# Patient Record
Sex: MALE | Race: WHITE | Employment: OTHER | URBAN - METROPOLITAN AREA
[De-identification: names, ages, dates, MRNs, and addresses within clinical notes are randomized per-mention and may not be internally consistent; named-entity substitution may affect disease eponyms.]

---

## 2017-12-12 ENCOUNTER — OFFICE VISIT (OUTPATIENT)
Dept: FAMILY MEDICINE CLINIC | Age: 57
End: 2017-12-12

## 2017-12-12 VITALS
WEIGHT: 176 LBS | RESPIRATION RATE: 16 BRPM | HEART RATE: 89 BPM | TEMPERATURE: 98.2 F | DIASTOLIC BLOOD PRESSURE: 87 MMHG | SYSTOLIC BLOOD PRESSURE: 122 MMHG | HEIGHT: 70 IN | BODY MASS INDEX: 25.2 KG/M2

## 2017-12-12 DIAGNOSIS — L25.5 CONTACT DERMATITIS DUE TO PLANTS, EXCEPT FOOD, UNSPECIFIED CONTACT DERMATITIS TYPE: Primary | ICD-10-CM

## 2017-12-12 PROCEDURE — 99213 OFFICE O/P EST LOW 20 MIN: CPT | Performed by: FAMILY MEDICINE

## 2017-12-12 RX ORDER — TRIAMCINOLONE ACETONIDE 1 MG/G
CREAM TOPICAL
Qty: 30 G | Refills: 1 | Status: SHIPPED | OUTPATIENT
Start: 2017-12-12 | End: 2022-05-23

## 2017-12-12 ASSESSMENT — PATIENT HEALTH QUESTIONNAIRE - PHQ9
SUM OF ALL RESPONSES TO PHQ QUESTIONS 1-9: 1
1. LITTLE INTEREST OR PLEASURE IN DOING THINGS: 0
SUM OF ALL RESPONSES TO PHQ9 QUESTIONS 1 & 2: 1
2. FEELING DOWN, DEPRESSED OR HOPELESS: 1

## 2017-12-12 NOTE — PATIENT INSTRUCTIONS
You should quit smoking for the benefit of your health and those who love you. Your goal is to quit smoking. The first step is to cut down your cigarettes per week by half. This is your immediate goal.   Cut down your number of cigarettes per week in half before your next visit. Go to  [employer] on the internet for more assistance in your effort to quit smoking. Or call 5-762- Pratibha Small for more assistance. Good luck and keep up the effort! Many patients quit after several times of trying to quit and failing, so do not get discouraged if you don't quit on your first try. Use the topical cream for the rash. Set up a physical after the first of the year.

## 2017-12-12 NOTE — PROGRESS NOTES
Subjective:      Patient ID: Margot Mcgee is a 65 y.o. male. HPI  Issues discussed some. Dr Sveta Avila, [facility], prescribes pain and ADD meds. 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 Has Dx PMR and has multiple joints that hurt and have been damaged. Retired on [address] . Was a  and could not do the work any longer. Has rash on his hands and lower arms. He thinks poison sumac. Improving. Most of issues relate to nerve and joint sx. Been grinding his teeth. Review of Systems  Pretty ok except the joints and some stress sx / feeling    Objective:   Physical Exam   Constitutional: He is oriented to person, place, and time. He appears well-developed and well-nourished. No distress. Pulmonary/Chest: Effort normal.   Neurological: He is alert and oriented to person, place, and time. Skin:   Minor dermatitis hands. Contact look. Psychiatric: He has a normal mood and affect. His behavior is normal. Thought content normal.       Assessment:        Contact dermatitis        Plan:         Discussed use of antibiotics. Advised when takes them he needs to take the whole course of medication or risk developing resistant bacteria. Has liked to keep antibiotics on hand if he gets infections. Topical steroids,  If not effective can call for Dosepak     Declines flu shot. Recommend setting up a physical    Prior to Visit Medications    Medication Sig Taking? Authorizing Provider   triamcinolone (KENALOG) 0.1 % cream Apply topically 2 times daily.  Yes Dama Sicard, DO   methylphenidate (RITALIN) 10 MG tablet Take 10 mg by mouth Yes Historical Provider, MD   ibuprofen (ADVIL;MOTRIN) 800 MG tablet Take 800 mg by mouth 2 times daily Yes Historical Provider, MD   methadone (DOLOPHINE) 10 MG tablet Take 10 mg by mouth 2 times daily Yes Historical Provider, MD   oxyCODONE-acetaminophen (PERCOCET)  MG per tablet Take 1 tablet by mouth 2 times daily Yes Historical Provider, MD           Will address preventive care issues at his physical.

## 2019-07-19 ENCOUNTER — TELEPHONE (OUTPATIENT)
Dept: FAMILY MEDICINE CLINIC | Age: 59
End: 2019-07-19

## 2021-12-04 ENCOUNTER — APPOINTMENT (OUTPATIENT)
Dept: GENERAL RADIOLOGY | Age: 61
End: 2021-12-04
Payer: COMMERCIAL

## 2021-12-04 ENCOUNTER — HOSPITAL ENCOUNTER (EMERGENCY)
Age: 61
Discharge: HOME | End: 2021-12-04
Payer: COMMERCIAL

## 2021-12-04 VITALS
OXYGEN SATURATION: 99 % | WEIGHT: 190 LBS | HEART RATE: 80 BPM | HEIGHT: 73 IN | TEMPERATURE: 98.4 F | SYSTOLIC BLOOD PRESSURE: 134 MMHG | RESPIRATION RATE: 16 BRPM | DIASTOLIC BLOOD PRESSURE: 84 MMHG | BODY MASS INDEX: 25.18 KG/M2

## 2021-12-04 DIAGNOSIS — S72.114A NONDISPLACED FRACTURE OF GREATER TROCHANTER OF RIGHT FEMUR, INITIAL ENCOUNTER FOR CLOSED FRACTURE (HCC): Primary | ICD-10-CM

## 2021-12-04 PROCEDURE — 6370000000 HC RX 637 (ALT 250 FOR IP): Performed by: PHYSICIAN ASSISTANT

## 2021-12-04 PROCEDURE — 99283 EMERGENCY DEPT VISIT LOW MDM: CPT

## 2021-12-04 PROCEDURE — 73502 X-RAY EXAM HIP UNI 2-3 VIEWS: CPT

## 2021-12-04 RX ORDER — OXYCODONE AND ACETAMINOPHEN 7.5; 325 MG/1; MG/1
1 TABLET ORAL ONCE
Status: COMPLETED | OUTPATIENT
Start: 2021-12-04 | End: 2021-12-04

## 2021-12-04 RX ORDER — OXYCODONE AND ACETAMINOPHEN 10; 325 MG/1; MG/1
1 TABLET ORAL EVERY 8 HOURS PRN
Qty: 12 TABLET | Refills: 0 | Status: SHIPPED | OUTPATIENT
Start: 2021-12-04 | End: 2021-12-08

## 2021-12-04 RX ADMIN — OXYCODONE HYDROCHLORIDE AND ACETAMINOPHEN 1 TABLET: 7.5; 325 TABLET ORAL at 18:34

## 2021-12-04 ASSESSMENT — PAIN SCALES - GENERAL: PAINLEVEL_OUTOF10: 8

## 2021-12-04 NOTE — ED TRIAGE NOTES
Pt reports falling onto his R hip last night. Has history of bilateral hip replacements. Uses crutches to get around.

## 2021-12-04 NOTE — ED PROVIDER NOTES
[facility] Emergency Department    CHIEF COMPLAINT  Fall (fell onto R hip last night) and Hip Pain (RIGHT)      SHARED SERVICE VISIT  Evaluated by ROBYN. My supervising physician was available for consultation. HISTORY OF PRESENT ILLNESS  Carrington Rollins is a 62 y.o. male who presents to the ED complaining of right hip pain. Patient has history of bilateral hip replacement and reports history of chronic pain as well. Patient states that he does fall frequently given the difficulty with stability of his hips. Patient states he was turning yesterday when he fell from a stool landing on his right hip. Patient states that he is now noting pain over his right hip that travels down the outside of his right leg. Patient reports pain with ambulation as well as pain with sitting. Patient states that he is concerned that the prosthesis might a fracture of the acetabulum. Injury to the head or neck. Patient states that he has not taken any medication for pain. No other complaints, modifying factors or associated symptoms. Nursing notes reviewed. Past Medical History:   Diagnosis Date    Foot pain     bilat - gotten worse    Osteoarthritis      Past Surgical History:   Procedure Laterality Date    HIP SURGERY Bilateral 2002    JOINT REPLACEMENT Bilateral     bilat hip replacement    JOINT REPLACEMENT Bilateral     shoulder replacement    SHOULDER SURGERY Bilateral 2002     History reviewed. No pertinent family history.   Social History     Socioeconomic History    Marital status: Single     Spouse name: Not on file    Number of children: Not on file    Years of education: Not on file    Highest education level: Not on file   Occupational History    Not on file   Tobacco Use    Smoking status: Current Some Day Smoker    Smokeless tobacco: Never Used    Tobacco comment: infrequently smokes a pipe or cigarette - hasn't had any since September 17   Substance and Sexual Activity    Alcohol use: Yes     Alcohol/week: 0.0 standard drinks     Comment: occassional    Drug use: Not on file    Sexual activity: Yes     Partners: Female   Other Topics Concern    Not on file   Social History Narrative    Not on file     Social Determinants of Health     Financial Resource Strain:     Difficulty of Paying Living Expenses: Not on file   Food Insecurity:     Worried About Running Out of Food in the Last Year: Not on file    Denae of Food in the Last Year: Not on file   Transportation Needs:     Lack of Transportation (Medical): Not on file    Lack of Transportation (Non-Medical): Not on file   Physical Activity:     Days of Exercise per Week: Not on file    Minutes of Exercise per Session: Not on file   Stress:     Feeling of Stress : Not on file   Social Connections:     Frequency of Communication with Friends and Family: Not on file    Frequency of Social Gatherings with Friends and Family: Not on file    Attends Rastafarian Services: Not on file    Active Member of [address] [place] [address] or Organizations: Not on file    Attends Club or Organization Meetings: Not on file    Marital Status: Not on file   Intimate Partner Violence:     Fear of Current or Ex-Partner: Not on file    Emotionally Abused: Not on file    Physically Abused: Not on file    Sexually Abused: Not on file   Housing Stability:     Unable to Pay for Housing in the Last Year: Not on file    Number of [place] in the Last Year: Not on file    Unstable Housing in the Last Year: Not on file     No current facility-administered medications for this encounter. Current Outpatient Medications   Medication Sig Dispense Refill    oxyCODONE-acetaminophen (PERCOCET)  MG per tablet Take 1 tablet by mouth every 8 hours as needed for Pain for up to 4 days. Intended supply: 30 days 12 tablet 0    triamcinolone (KENALOG) 0.1 % cream Apply topically 2 times daily.  30 g 1    methylphenidate (RITALIN) 10 MG tablet Take 10 mg by mouth      ibuprofen (ADVIL;MOTRIN) 800 MG tablet Take 800 mg by mouth 2 times daily      methadone (DOLOPHINE) 10 MG tablet Take 10 mg by mouth 2 times daily       No Known Allergies    REVIEW OF SYSTEMS  7 systems reviewed, pertinent positives per HPI otherwise noted to be negative    PHYSICAL EXAM  /84   Pulse 80   Temp 98.4 °F (36.9 °C) (Oral)   Resp 16   Ht 6' 1\" (1.854 m)   Wt 190 lb (86.2 kg)   SpO2 99%   BMI 25.07 kg/m²   GENERAL APPEARANCE: Awake and alert. Cooperative. HEAD: Normocephalic. Atraumatic. EYES: EOM grossly intact. ENT: Mucous membranes are moist.   NECK: Supple. HEART: RRR. No murmurs. LUNGS: Respirations unlabored. CTAB. Good air exchange. Speaking comfortably in full sentences. EXTREMITIES: Pain with palpation to the right greater trochanter. Pain with range of motion of the right hip. Pain with internal or external rotation. SKIN: Warm and dry. No acute rashes. NEUROLOGICAL: Alert and oriented. No gross facial drooping. PSYCHIATRIC: Normal mood and affect. RADIOLOGY  XR HIP 2-3 VW W PELVIS RIGHT   Final Result   Periprosthetic fracture in the region the right greater trochanter               LABS  Labs Reviewed - No data to display    PROCEDURES  Unless otherwise noted below, none  Procedures    MDM  70-year-old male history of chronic pain s/p bilateral hip replacements and prior pelvic fractures presents to ED for evaluation of right hip pain after fall that occurred yesterday. The fall was from roughly 2 to 3 feet landing on his right hip. On exam patient reports pain with any movement or palpation to the right hip. We will plan to obtain x-ray imaging to rule out acute fracture. Discussed with the patient plan with orthopedics as well as pain management. Patient states that he has seen pain management in the past but did not have much success. No injury to the head neck or loss consciousness reported.     X-ray imaging did demonstrate

## 2021-12-05 NOTE — CONSULTS
Placed call to Orthopedic Surgery @ 0026  RE: Arely Valle MD; Farideh George; greater trochanter fracture per Wilman Brothers called back @ [phone]

## 2021-12-07 DIAGNOSIS — S72.114A NONDISPLACED FRACTURE OF GREATER TROCHANTER OF RIGHT FEMUR, INITIAL ENCOUNTER FOR CLOSED FRACTURE (HCC): ICD-10-CM

## 2021-12-07 RX ORDER — OXYCODONE HYDROCHLORIDE AND ACETAMINOPHEN 5; 325 MG/1; MG/1
1 TABLET ORAL EVERY 6 HOURS PRN
Qty: 42 TABLET | Refills: 0 | Status: SHIPPED | OUTPATIENT
Start: 2021-12-07 | End: 2021-12-14

## 2021-12-07 RX ORDER — TIZANIDINE 4 MG/1
4 TABLET ORAL 4 TIMES DAILY PRN
Qty: 120 TABLET | Refills: 0 | Status: SHIPPED | OUTPATIENT
Start: 2021-12-07 | End: 2022-01-12 | Stop reason: SDUPTHER

## 2021-12-08 NOTE — PROGRESS NOTES
pateint with recent fall . Chronic history of PMR with bilateral hips metal on metal bearings and bilateral shoulder resurfacing. Phone evalution after Gayla jones noted fracture of the greater trochanter. Plan for pain medications and recheck in anohter 4-5 days for displacement.

## 2021-12-16 ENCOUNTER — OFFICE VISIT (OUTPATIENT)
Dept: ORTHOPEDIC SURGERY | Age: 61
End: 2021-12-16
Payer: COMMERCIAL

## 2021-12-16 DIAGNOSIS — M16.0 PRIMARY OSTEOARTHRITIS OF BOTH HIPS: Primary | ICD-10-CM

## 2021-12-16 PROCEDURE — 99203 OFFICE O/P NEW LOW 30 MIN: CPT | Performed by: ORTHOPAEDIC SURGERY

## 2021-12-16 PROCEDURE — 27246 TREAT THIGH FRACTURE: CPT | Performed by: ORTHOPAEDIC SURGERY

## 2021-12-16 RX ORDER — OXYCODONE AND ACETAMINOPHEN 10; 325 MG/1; MG/1
1 TABLET ORAL EVERY 8 HOURS PRN
Qty: 45 TABLET | Refills: 0 | Status: SHIPPED | OUTPATIENT
Start: 2021-12-16 | End: 2022-01-12 | Stop reason: SDUPTHER

## 2021-12-20 NOTE — PROGRESS NOTES
Patient Name: Geraldo Garcia  : 1960  DOS: 2021        Chief Complaint:   Chief Complaint   Patient presents with    Hip Pain     RIGHT HIP-[facility] ED 21       History of Present Illness:  Geraldo Garcia is a 62 y.o. male who presents with a chief complaint of that of right hip pain. He said a history of polymyalgia rheumatica with AVN of both shoulders and both hips. He has been managing work details until just recently when he underwent evaluations and resulted in permanent disability. He is felt that he has had intermittent subluxations of both his hips and 1 week plus ago he fell while in his garage directly onto his right hip was seen at [facility] ED on 2021 and noted to have a greater trochanter fracture. He has been limited to wheelchair crutch and walker ambulation which is very unusual for him. His pain levels are so significant that they to him resemble what he had prior to his surgical interventions originally. He has not had blood test since his original intervention more because of apprehensions regarding potential problems and also insurance related issues. Medical History  Current Medications:   Prior to Admission medications    Medication Sig Start Date End Date Taking? Authorizing Provider   oxyCODONE-acetaminophen (PERCOCET)  MG per tablet Take 1 tablet by mouth every 8 hours as needed for Pain for up to 30 days. Intended supply: 30 days 12/16/21 1/15/22 Yes Jessee Ring MD   tiZANidine (ZANAFLEX) 4 MG tablet Take 1 tablet by mouth 4 times daily as needed (muscle spasm) 21   Yenny Landeros MD   triamcinolone (KENALOG) 0.1 % cream Apply topically 2 times daily.  17   Ruchi Magana DO   methylphenidate (RITALIN) 10 MG tablet Take 10 mg by mouth    Historical Provider, MD   ibuprofen (ADVIL;MOTRIN) 800 MG tablet Take 800 mg by mouth 2 times daily    Historical Provider, MD   methadone (DOLOPHINE) 10 MG tablet Take 10 mg by mouth 2 times daily    Historical Provider, MD     Allergies: No Known Allergies  Past Medical History:   Diagnosis Date    Foot pain     bilat - gotten worse    Osteoarthritis      Social History     Socioeconomic History    Marital status: Single     Spouse name: Not on file    Number of children: Not on file    Years of education: Not on file    Highest education level: Not on file   Occupational History    Not on file   Tobacco Use    Smoking status: Current Some Day Smoker    Smokeless tobacco: Never Used    Tobacco comment: infrequently smokes a pipe or cigarette - hasn't had any since September 17   Substance and Sexual Activity    Alcohol use: Yes     Alcohol/week: 0.0 standard drinks     Comment: occassional    Drug use: Not on file    Sexual activity: Yes     Partners: Female   Other Topics Concern    Not on file   Social History Narrative    Not on file     Social Determinants of Health     Financial Resource Strain:     Difficulty of Paying Living Expenses: Not on file   Food Insecurity:     Worried About Running Out of Food in the Last Year: Not on file    Denae of Food in the Last Year: Not on file   Transportation Needs:     Lack of Transportation (Medical): Not on file    Lack of Transportation (Non-Medical):  Not on file   Physical Activity:     Days of Exercise per Week: Not on file    Minutes of Exercise per Session: Not on file   Stress:     Feeling of Stress : Not on file   Social Connections:     Frequency of Communication with Friends and Family: Not on file    Frequency of Social Gatherings with Friends and Family: Not on file    Attends [employer] Services: Not on file    Active Member of Clubs or Organizations: Not on file    Attends Club or Organization Meetings: Not on file    Marital Status: Not on file   Intimate Partner Violence:     Fear of Current or Ex-Partner: Not on file    Emotionally Abused: Not on file    Physically Abused: Not on file   Kofis Roys Sexually temperature or color discrepancies. NEUROLOGICALLY: There is no evidence for sensory or motor deficits in the extremity. Coordination appears full with no spacticity or rigidity. Reflexes appear to be symmetric. Distal circulation intact. No signs of RSD. Additional Comments:     Diagnostic Test Findings: New x-rays were obtained of the right hip with some evaluation of the left hip noting metal-on-metal implants and fracture of the greater trochanter with some displacement although no proximal migration    Procedure(s): None    Assessment and Plan:  1. Primary osteoarthritis of both hips        No follow-ups on file. The orders below, if any, were placed during this visit:   Orders Placed This Encounter   Procedures    XR HIP RIGHT (2-3 VIEWS)       Impression:   [unfilled]    Treatment Plan: Fracture noted of the greater trochanter right total hip arthroplasty with presumed osteolysis from metal-on-metal debris bilaterally    No signs of any vincent fracture related to subsidence of the prosthesis or loss of fixation of the acetabulum  Lab tests are unavailable for sedimentation rate or C-reactive protein but these were ordered. We have recommended recheck in another 2 weeks to see that the fracture has not migrated.     Ultimately once the fracture heals revision hip arthroplasty may be necessary to finalize the metal-on-metal debris and limited its extension into the acetabulum and femoral bone         Elizabeth Rodriguez MD

## 2022-01-12 ENCOUNTER — OFFICE VISIT (OUTPATIENT)
Dept: ORTHOPEDIC SURGERY | Age: 62
End: 2022-01-12

## 2022-01-12 VITALS — BODY MASS INDEX: 25.18 KG/M2 | HEIGHT: 73 IN | WEIGHT: 190 LBS

## 2022-01-12 DIAGNOSIS — M16.0 PRIMARY OSTEOARTHRITIS OF BOTH HIPS: ICD-10-CM

## 2022-01-12 DIAGNOSIS — S72.351D CLOSED DISPLACED COMMINUTED FRACTURE OF SHAFT OF RIGHT FEMUR WITH ROUTINE HEALING, SUBSEQUENT ENCOUNTER: Primary | ICD-10-CM

## 2022-01-12 PROCEDURE — 99024 POSTOP FOLLOW-UP VISIT: CPT | Performed by: ORTHOPAEDIC SURGERY

## 2022-01-12 RX ORDER — TIZANIDINE 4 MG/1
4 TABLET ORAL 4 TIMES DAILY PRN
Qty: 120 TABLET | Refills: 0 | Status: ON HOLD | OUTPATIENT
Start: 2022-01-12 | End: 2022-05-27 | Stop reason: SDUPTHER

## 2022-01-12 RX ORDER — OXYCODONE AND ACETAMINOPHEN 10; 325 MG/1; MG/1
1 TABLET ORAL EVERY 8 HOURS PRN
Qty: 45 TABLET | Refills: 0 | Status: SHIPPED | OUTPATIENT
Start: 2022-01-12 | End: 2022-02-09 | Stop reason: SDUPTHER

## 2022-01-12 NOTE — PROGRESS NOTES
Patient Name: Stefano Alcala  : 1960  DOS: 2022        Chief Complaint:   Chief Complaint   Patient presents with    Hip Pain     Bilateral hip pain, 9/10     Pain has been changing but he is feeling less pain overall with improved walking. History of Present Illness:  Stefano Alcala is a 65 y.o. male who presents with a chief complaint of that of right hip pain. He said a history of polymyalgia rheumatica with AVN of both shoulders and both hips. He has been managing work details until just recently when he underwent evaluations and resulted in permanent disability. He is felt that he has had intermittent subluxations of both his hips and 1 week plus ago he fell while in his garage directly onto his right hip was seen at [facility] ED on 2021 and noted to have a greater trochanter fracture. He has been limited to wheelchair crutch and walker ambulation which is very unusual for him. His pain levels are so significant that they to him resemble what he had prior to his surgical interventions originally. He has not had blood test since his original intervention more because of apprehensions regarding potential problems and also insurance related issues. Medical History  Current Medications:   Prior to Admission medications    Medication Sig Start Date End Date Taking? Authorizing Provider   tiZANidine (ZANAFLEX) 4 MG tablet Take 1 tablet by mouth 4 times daily as needed (muscle spasm) 22  Yes Ivan Brady MD   oxyCODONE-acetaminophen (PERCOCET)  MG per tablet Take 1 tablet by mouth every 8 hours as needed for Pain for up to 30 days. Intended supply: 30 days 22 Yes Sambhu Maxene Goldberg, MD   triamcinolone (KENALOG) 0.1 % cream Apply topically 2 times daily.  17   Kayla Means, DO   methylphenidate (RITALIN) 10 MG tablet Take 10 mg by mouth    Historical Provider, MD   ibuprofen (ADVIL;MOTRIN) 800 MG tablet Take 800 mg by mouth 2 times daily Historical Provider, MD   methadone (DOLOPHINE) 10 MG tablet Take 10 mg by mouth 2 times daily    Historical Provider, MD     Allergies: No Known Allergies  Past Medical History:   Diagnosis Date    Foot pain     bilat - gotten worse    Osteoarthritis      Social History     Socioeconomic History    Marital status: Single     Spouse name: Not on file    Number of children: Not on file    Years of education: Not on file    Highest education level: Not on file   Occupational History    Not on file   Tobacco Use    Smoking status: Current Some Day Smoker    Smokeless tobacco: Never Used    Tobacco comment: infrequently smokes a pipe or cigarette - hasn't had any since September 17   Substance and Sexual Activity    Alcohol use: Yes     Alcohol/week: 0.0 standard drinks     Comment: occassional    Drug use: Not on file    Sexual activity: Yes     Partners: Female   Other Topics Concern    Not on file   Social History Narrative    Not on file     Social Determinants of Health     Financial Resource Strain:     Difficulty of Paying Living Expenses: Not on file   Food Insecurity:     Worried About Running Out of Food in the Last Year: Not on file    Denae of Food in the Last Year: Not on file   Transportation Needs:     Lack of Transportation (Medical): Not on file    Lack of Transportation (Non-Medical):  Not on file   Physical Activity:     Days of Exercise per Week: Not on file    Minutes of Exercise per Session: Not on file   Stress:     Feeling of Stress : Not on file   Social Connections:     Frequency of Communication with Friends and Family: Not on file    Frequency of Social Gatherings with Friends and Family: Not on file    Attends Oriental orthodox Services: Not on file    Active Member of Clubs or Organizations: Not on file    Attends Club or Organization Meetings: Not on file    Marital Status: Not on file   Intimate Partner Violence:     Fear of Current or Ex-Partner: Not on file   [place] Emotionally Abused: Not on file    Physically Abused: Not on file    Sexually Abused: Not on file   Housing Stability:     Unable to Pay for Housing in the Last Year: Not on file    Number of Places Lived in the Last Year: Not on file    Unstable Housing in the Last Year: Not on file     No family history on file. Review of Systems:   Review of Systems   Constitutional: Negative for chills and fever. HENT: Negative for nosebleeds. Eyes: Negative for double vision. Cardiovascular: Negative for chest pain. Gastrointestinal: Negative for abdominal pain. Musculoskeletal: Positive for joint pain and myalgias. Skin: Negative for rash. Neurological: Negative for seizures. Psychiatric/Behavioral: Negative for hallucinations. All other systems reviewed and negative     Assessment   Vital Signs:   Ht 6' 1\" (1.854 m)   Wt 190 lb (86.2 kg)   BMI 25.07 kg/m²     Physical Exam   Constitutional: Patient is oriented to person, place, and time and well-developed, well-nourished, and in no distress. HENT:   Head: Normocephalic and atraumatic. Eyes: Pupils are equal, round, and reactive to light. Neck: No tracheal deviation present. No thyromegaly present. Pulmonary/Chest: Effort normal.   Abdominal: Soft. There is no guarding. Musculoskeletal: Patient exhibits tenderness and pain. Neurological: Patient is alert and oriented to person, place, and time. Skin: Skin is warm. Psychiatric: Affect normal.       Right Hip Examination    Inspection: There is no swelling or ecchymosis. There is no obvious deformity. Palpation: There is tenderness over the greater trochanter. There is anterior groin tenderness. Range of Motion: ANIT567 ER10  IR10 limited secondary to pain. Bruising of the anterior and psoterior thigh    Strength: Hip flexors rated: 4/5. Special Tests: Trendelenberg sign: positive       Skin: There are no rashes, ulcerations or lesions.     Gait: Patient walks with a limp.     Skin shows no rashes/ecchymosis to the affected area, no hyperesthesias, no discoloration, no temperature or color discrepancies. NEUROLOGICALLY: There is no evidence for sensory or motor deficits in the extremity. Coordination appears full with no spacticity or rigidity. Reflexes appear to be symmetric. Distal circulation intact. No signs of RSD. Additional Comments:     Diagnostic Test Findings: New x-rays were obtained of the right hip with some evaluation of the left hip noting metal-on-metal implants and fracture of the greater trochanter with some displacement and now proximal migration  Subluxation of the head noted on ap supine film with reduction upon roation of the leg. Procedure(s): None    Assessment and Plan:  1. Closed displaced comminuted fracture of shaft of right femur with routine healing, subsequent encounter    2. Primary osteoarthritis of both hips        No follow-ups on file. The orders below, if any, were placed during this visit:   Orders Placed This Encounter   Procedures    XR HIP RIGHT (2-3 VIEWS)     Standing Status:   Future     Number of Occurrences:   1     Standing Expiration Date:   1/12/2023       Impression:   [unfilled]    Treatment Plan: Fracture noted of the greater trochanter right total hip arthroplasty with presumed osteolysis from metal-on-metal debris bilaterally    No signs of any vincent fracture related to subsidence of the prosthesis or loss of fixation of the acetabulum  Lab tests are unavailable for sedimentation rate or C-reactive protein but these were ordered. We have recommended recheck in another 2 weeks to see that the fracture has not migrated.     Ultimately once the fracture heals revision hip arthroplasty may be necessary to finalize the metal-on-metal debris and limited its extension into the acetabulum and femoral bone         Teri Griffin MD

## 2022-02-09 ENCOUNTER — OFFICE VISIT (OUTPATIENT)
Dept: ORTHOPEDIC SURGERY | Age: 62
End: 2022-02-09
Payer: MEDICARE

## 2022-02-09 VITALS — WEIGHT: 190 LBS | BODY MASS INDEX: 25.18 KG/M2 | HEIGHT: 73 IN

## 2022-02-09 DIAGNOSIS — S72.351D CLOSED DISPLACED COMMINUTED FRACTURE OF SHAFT OF RIGHT FEMUR WITH ROUTINE HEALING, SUBSEQUENT ENCOUNTER: Primary | ICD-10-CM

## 2022-02-09 DIAGNOSIS — M16.0 PRIMARY OSTEOARTHRITIS OF BOTH HIPS: ICD-10-CM

## 2022-02-09 DIAGNOSIS — M25.561 RIGHT KNEE PAIN, UNSPECIFIED CHRONICITY: ICD-10-CM

## 2022-02-09 PROBLEM — S72.351A CLOSED DISPLACED COMMINUTED FRACTURE OF SHAFT OF RIGHT FEMUR (HCC): Status: ACTIVE | Noted: 2022-02-09

## 2022-02-09 PROCEDURE — L1812 KO ELASTIC W/JOINTS PRE OTS: HCPCS | Performed by: ORTHOPAEDIC SURGERY

## 2022-02-09 PROCEDURE — 99024 POSTOP FOLLOW-UP VISIT: CPT | Performed by: ORTHOPAEDIC SURGERY

## 2022-02-09 RX ORDER — OXYCODONE AND ACETAMINOPHEN 10; 325 MG/1; MG/1
1 TABLET ORAL EVERY 8 HOURS PRN
Qty: 40 TABLET | Refills: 0 | Status: SHIPPED | OUTPATIENT
Start: 2022-02-09 | End: 2022-03-09 | Stop reason: SDUPTHER

## 2022-02-09 RX ORDER — AMOXICILLIN AND CLAVULANATE POTASSIUM 875; 125 MG/1; MG/1
1 TABLET, FILM COATED ORAL 2 TIMES DAILY
Qty: 20 TABLET | Refills: 2 | Status: SHIPPED | OUTPATIENT
Start: 2022-02-09 | End: 2022-03-11

## 2022-02-09 NOTE — PROGRESS NOTES
Patient Name: Radha Carter  : 1960  DOS: 2022        Chief Complaint:   Chief Complaint   Patient presents with    Hip Pain     FX RIGHT GREATER [address]        More pain in the right knee over the past several weeks. Pain has been changing but he is feeling less pain overall with improved walking with regards to the hip on the right. History of Present Illness:  Radha Carter is a 65 y.o. male who presents with a chief complaint of that of right hip pain. He said a history of polymyalgia rheumatica with AVN of both shoulders and both hips. He has been managing work details until just recently when he underwent evaluations and resulted in permanent disability. He is felt that he has had intermittent subluxations of both his hips and 1 week plus ago he fell while in his garage directly onto his right hip was seen at [facility] ED on 2021 and noted to have a greater trochanter fracture. He has been limited to wheelchair crutch and walker ambulation which is very unusual for him. His pain levels are so significant that they to him resemble what he had prior to his surgical interventions originally. He has not had blood test since his original intervention more because of apprehensions regarding potential problems and also insurance related issues. Medical History  Current Medications:   Prior to Admission medications    Medication Sig Start Date End Date Taking? Authorizing Provider   oxyCODONE-acetaminophen (PERCOCET)  MG per tablet Take 1 tablet by mouth every 8 hours as needed for Pain for up to 30 days.  Intended supply: 30 days 2/9/22 3/11/22 Yes Marcia Watson MD   amoxicillin-clavulanate (AUGMENTIN) 875-125 MG per tablet Take 1 tablet by mouth 2 times daily 2/9/22 3/11/22 Yes Jessee Mtz MD   tiZANidine (ZANAFLEX) 4 MG tablet Take 1 tablet by mouth 4 times daily as needed (muscle spasm) 22   Marcia Watson MD   triamcinolone (KENALOG) 0.1 % cream Apply topically 2 times daily. 12/12/17   Jaylin Longoria,    methylphenidate (RITALIN) 10 MG tablet Take 10 mg by mouth    Historical Provider, MD   ibuprofen (ADVIL;MOTRIN) 800 MG tablet Take 800 mg by mouth 2 times daily    Historical Provider, MD   methadone (DOLOPHINE) 10 MG tablet Take 10 mg by mouth 2 times daily    Historical Provider, MD     Allergies: No Known Allergies  Past Medical History:   Diagnosis Date    Foot pain     bilat - gotten worse    Osteoarthritis      Social History     Socioeconomic History    Marital status: Single     Spouse name: Not on file    Number of children: Not on file    Years of education: Not on file    Highest education level: Not on file   Occupational History    Not on file   Tobacco Use    Smoking status: Current Some Day Smoker    Smokeless tobacco: Never Used    Tobacco comment: infrequently smokes a pipe or cigarette - hasn't had any since September 17   Substance and Sexual Activity    Alcohol use: Yes     Alcohol/week: 0.0 standard drinks     Comment: occassional    Drug use: Not on file    Sexual activity: Yes     Partners: Female   Other Topics Concern    Not on file   Social History Narrative    Not on file     Social Determinants of Health     Financial Resource Strain:     Difficulty of Paying Living Expenses: Not on file   Food Insecurity:     Worried About Running Out of Food in the Last Year: Not on file    Denae of Food in the Last Year: Not on file   Transportation Needs:     Lack of Transportation (Medical): Not on file    Lack of Transportation (Non-Medical):  Not on file   Physical Activity:     Days of Exercise per Week: Not on file    Minutes of Exercise per Session: Not on file   Stress:     Feeling of Stress : Not on file   Social Connections:     Frequency of Communication with Friends and Family: Not on file    Frequency of Social Gatherings with Friends and Family: Not on file    Attends Rastafarian Services: Not on file   [address] [employer] or Organizations: Not on file    Attends Club or Organization Meetings: Not on file    Marital Status: Not on file   Intimate Partner Violence:     Fear of Current or Ex-Partner: Not on file    Emotionally Abused: Not on file    Physically Abused: Not on file    Sexually Abused: Not on file   Housing Stability:     Unable to Pay for Housing in the Last Year: Not on file    Number of [place] in the Last Year: Not on file    Unstable Housing in the Last Year: Not on file     No family history on file. Review of Systems:   Review of Systems   Constitutional: Negative for chills and fever. HENT: Negative for nosebleeds. Eyes: Negative for double vision. Cardiovascular: Negative for chest pain. Gastrointestinal: Negative for abdominal pain. Musculoskeletal: Positive for joint pain and myalgias. Skin: Negative for rash. Neurological: Negative for seizures. Psychiatric/Behavioral: Negative for hallucinations. All other systems reviewed and negative     Assessment   Vital Signs:   Ht 6' 1\" (1.854 m)   Wt 190 lb (86.2 kg)   BMI 25.07 kg/m²     Physical Exam   Constitutional: Patient is oriented to person, place, and time and well-developed, well-nourished, and in no distress. HENT:   Head: Normocephalic and atraumatic. Eyes: Pupils are equal, round, and reactive to light. Neck: No tracheal deviation present. No thyromegaly present. Pulmonary/Chest: Effort normal.   Abdominal: Soft. There is no guarding. Musculoskeletal: Patient exhibits tenderness and pain. Neurological: Patient is alert and oriented to person, place, and time. Skin: Skin is warm. Psychiatric: Affect normal.       Right Hip Examination    Inspection: There is no swelling or ecchymosis. There is no obvious deformity. Palpation: There is tenderness over the greater trochanter. There is anterior groin tenderness.      Range of Motion: [id number] ER10  IR10 limited secondary to pain. Bruising of the anterior and psoterior thigh    Strength: Hip flexors rated: 4/5. Special Tests: Trendelenberg sign: positive       Skin: There are no rashes, ulcerations or lesions. Gait: Patient walks with a limp. Flexion and abduction with some weakness but improved. Skin shows no rashes/ecchymosis to the affected area, no hyperesthesias, no discoloration, no temperature or color discrepancies. NEUROLOGICALLY: There is no evidence for sensory or motor deficits in the extremity. Coordination appears full with no spacticity or rigidity. Reflexes appear to be symmetric. Distal circulation intact. No signs of RSD. Knee on the same side with valgus angulation and pain lateral joint. pseudolaxity  but correction to neutral.       Additional Comments:     Diagnostic Test Findings: New x-rays were obtained of the right hip with some evaluation of the left hip noting metal-on-metal implants and fracture of the greater trochanter with some displacement and now proximal migration  Subluxation of the head noted on ap supine film with reduction upon roation of the leg. Procedure(s): None    Assessment and Plan:  1. Closed displaced comminuted fracture of shaft of right femur with routine healing, subsequent encounter    2. Right knee pain, unspecified chronicity    3. Primary osteoarthritis of both hips        No follow-ups on file.     The orders below, if any, were placed during this visit:   Orders Placed This Encounter   Procedures    XR HIP 2-3 VW W PELVIS RIGHT     Standing Status:   Future     Number of Occurrences:   1     Standing Expiration Date:   3/9/2022     Order Specific Question:   Reason for exam:     Answer:   Pain    XR KNEE RIGHT (MIN 4 VIEWS)     Standing Status:   Future     Number of Occurrences:   1     Standing Expiration Date:   3/9/2022     Order Specific Question:   Reason for exam:     Answer:   Pain       Impression:   [unfilled]    Treatment Plan: Fracture noted of the greater trochanter right total hip arthroplasty with presumed osteolysis from metal-on-metal debris bilaterally    No signs of any vincent fracture related to subsidence of the prosthesis or loss of fixation of the acetabulum  Lab tests are unavailable for sedimentation rate or C-reactive protein but these were ordered. We have recommended recheck in another 2 weeks to see that the fracture has not migrated. Ultimately once the fracture heals revision hip arthroplasty may be necessary to finalize the metal-on-metal debris and limited its extension into the acetabulum and femoral bone    Right knee unfortunately has major lateral joint line loss with bone on bone changes and valgus angulation that is apparent clinically as well. Potential for additional knee arthroplasty noted in the future. Follow now for continued bone healing across the migrated greater troch fracture. Plan for potential CT scan at some time in the future for additional planning once hip is more healed.           Fiona Griggs MD no

## 2022-03-09 ENCOUNTER — OFFICE VISIT (OUTPATIENT)
Dept: ORTHOPEDIC SURGERY | Age: 62
End: 2022-03-09

## 2022-03-09 VITALS — BODY MASS INDEX: 25.18 KG/M2 | WEIGHT: 190 LBS | HEIGHT: 73 IN

## 2022-03-09 DIAGNOSIS — S72.351D CLOSED DISPLACED COMMINUTED FRACTURE OF SHAFT OF RIGHT FEMUR WITH ROUTINE HEALING, SUBSEQUENT ENCOUNTER: Primary | ICD-10-CM

## 2022-03-09 DIAGNOSIS — M16.0 PRIMARY OSTEOARTHRITIS OF BOTH HIPS: ICD-10-CM

## 2022-03-09 DIAGNOSIS — M25.561 RIGHT KNEE PAIN, UNSPECIFIED CHRONICITY: ICD-10-CM

## 2022-03-09 PROCEDURE — 99024 POSTOP FOLLOW-UP VISIT: CPT | Performed by: ORTHOPAEDIC SURGERY

## 2022-03-09 RX ORDER — OXYCODONE AND ACETAMINOPHEN 10; 325 MG/1; MG/1
1 TABLET ORAL EVERY 8 HOURS PRN
Qty: 40 TABLET | Refills: 0 | Status: SHIPPED | OUTPATIENT
Start: 2022-03-09 | End: 2022-05-03 | Stop reason: SDUPTHER

## 2022-03-09 NOTE — PROGRESS NOTES
Patient Name: Elisa Mason  : 1960  DOS: 3/9/2022        Chief Complaint:   Chief Complaint   Patient presents with    Leg Pain     RIGHT LEG-FX GREATER [address]       More pain in the right knee over the past several weeks. Off crutches recently    Pain has been changing but he is feeling less pain overall with improved walking with regards to the hip on the right. History of Present Illness:  Elisa Mason is a 65 y.o. male who presents with a chief complaint of that of right hip pain. He said a history of polymyalgia rheumatica with AVN of both shoulders and both hips. He has been managing work details until just recently when he underwent evaluations and resulted in permanent disability. He is felt that he has had intermittent subluxations of both his hips and 1 week plus ago he fell while in his garage directly onto his right hip was seen at [facility] ED on 2021 and noted to have a greater trochanter fracture. He has been limited to wheelchair crutch and walker ambulation which is very unusual for him. His pain levels are so significant that they to him resemble what he had prior to his surgical interventions originally. He has not had blood test since his original intervention more because of apprehensions regarding potential problems and also insurance related issues. Medical History  Current Medications:   Prior to Admission medications    Medication Sig Start Date End Date Taking? Authorizing Provider   oxyCODONE-acetaminophen (PERCOCET)  MG per tablet Take 1 tablet by mouth every 8 hours as needed for Pain for up to 30 days.  Intended supply: 30 days 3/9/22 4/8/22 Yes Sudha Gipson MD   amoxicillin-clavulanate (AUGMENTIN) 875-125 MG per tablet Take 1 tablet by mouth 2 times daily 2/9/22 3/11/22  Sudha Gipson MD   tiZANidine (ZANAFLEX) 4 MG tablet Take 1 tablet by mouth 4 times daily as needed (muscle spasm) 22   Sudha Gipson MD   triamcinolone (KENALOG) 0.1 % cream Apply topically 2 times daily. 12/12/17   Clarissa Coronado,    methylphenidate (RITALIN) 10 MG tablet Take 10 mg by mouth    Historical Provider, MD   ibuprofen (ADVIL;MOTRIN) 800 MG tablet Take 800 mg by mouth 2 times daily    Historical Provider, MD   methadone (DOLOPHINE) 10 MG tablet Take 10 mg by mouth 2 times daily    Historical Provider, MD     Allergies: No Known Allergies  Past Medical History:   Diagnosis Date    Foot pain     bilat - gotten worse    Osteoarthritis      Social History     Socioeconomic History    Marital status: Single     Spouse name: Not on file    Number of children: Not on file    Years of education: Not on file    Highest education level: Not on file   Occupational History    Not on file   Tobacco Use    Smoking status: Current Some Day Smoker    Smokeless tobacco: Never Used    Tobacco comment: infrequently smokes a pipe or cigarette - hasn't had any since September 17   Substance and Sexual Activity    Alcohol use: Yes     Alcohol/week: 0.0 standard drinks     Comment: occassional    Drug use: Not on file    Sexual activity: Yes     Partners: Female   Other Topics Concern    Not on file   Social History Narrative    Not on file     Social Determinants of Health     Financial Resource Strain:     Difficulty of Paying Living Expenses: Not on file   Food Insecurity:     Worried About Running Out of Food in the Last Year: Not on file    Denae of Food in the Last Year: Not on file   Transportation Needs:     Lack of Transportation (Medical): Not on file    Lack of Transportation (Non-Medical):  Not on file   Physical Activity:     Days of Exercise per Week: Not on file    Minutes of Exercise per Session: Not on file   Stress:     Feeling of Stress : Not on file   Social Connections:     Frequency of Communication with Friends and Family: Not on file    Frequency of Social Gatherings with Friends and Family: Not on file    Attends Yazdanism Services: Not on file    Active Member of Clubs or Organizations: Not on file    Attends Club or Organization Meetings: Not on file    Marital Status: Not on file   Intimate Partner Violence:     Fear of Current or Ex-Partner: Not on file    Emotionally Abused: Not on file    Physically Abused: Not on file    Sexually Abused: Not on file   Housing Stability:     Unable to Pay for Housing in the Last Year: Not on file    Number of [place] in the Last Year: Not on file    Unstable Housing in the Last Year: Not on file     No family history on file. Review of Systems:   Review of Systems   Constitutional: Negative for chills and fever. HENT: Negative for nosebleeds. Eyes: Negative for double vision. Cardiovascular: Negative for chest pain. Gastrointestinal: Negative for abdominal pain. Musculoskeletal: Positive for joint pain and myalgias. Skin: Negative for rash. Neurological: Negative for seizures. Psychiatric/Behavioral: Negative for hallucinations. All other systems reviewed and negative     Assessment   Vital Signs:   Ht 6' 1\" (1.854 m)   Wt 190 lb (86.2 kg)   BMI 25.07 kg/m²     Physical Exam   Constitutional: Patient is oriented to person, place, and time and well-developed, well-nourished, and in no distress. HENT:   Head: Normocephalic and atraumatic. Eyes: Pupils are equal, round, and reactive to light. Neck: No tracheal deviation present. No thyromegaly present. Pulmonary/Chest: Effort normal.   Abdominal: Soft. There is no guarding. Musculoskeletal: Patient exhibits tenderness and pain. Neurological: Patient is alert and oriented to person, place, and time. Skin: Skin is warm. Psychiatric: Affect normal.       Right Hip Examination    Inspection: There is no swelling or ecchymosis. There is no obvious deformity. Palpation: There is tenderness over the greater trochanter. There is anterior groin tenderness.      Range of Motion: [id number] ER10  IR10 limited secondary to pain. Bruising of the anterior and psoterior thigh    Strength: Hip flexors rated: 4/5. Special Tests: Trendelenberg sign: positive       Skin: There are no rashes, ulcerations or lesions. Gait: Patient walks with a limp. Flexion and abduction with some weakness but improved. Skin shows no rashes/ecchymosis to the affected area, no hyperesthesias, no discoloration, no temperature or color discrepancies. NEUROLOGICALLY: There is no evidence for sensory or motor deficits in the extremity. Coordination appears full with no spacticity or rigidity. Reflexes appear to be symmetric. Distal circulation intact. No signs of RSD. Knee on the same side with valgus angulation and pain lateral joint. pseudolaxity  but correction to neutral.       Additional Comments:     Diagnostic Test Findings: New x-rays were obtained of the right hip with some evaluation of the left hip noting metal-on-metal implants and fracture of the greater trochanter with some displacement and now proximal migration  Subluxation of the head noted on ap supine film with reduction upon roation of the leg. Procedure(s): None    Assessment and Plan:  1. Closed displaced comminuted fracture of shaft of right femur with routine healing, subsequent encounter    2. Right knee pain, unspecified chronicity    3. Primary osteoarthritis of both hips        No follow-ups on file.     The orders below, if any, were placed during this visit:   Orders Placed This Encounter   Procedures    XR HIP 2-3 VW W PELVIS RIGHT     Standing Status:   Future     Number of Occurrences:   1     Standing Expiration Date:   4/9/2022     Order Specific Question:   Reason for exam:     Answer:   Pain       Impression:   [unfilled]    Treatment Plan: Fracture noted of the greater trochanter right total hip arthroplasty with presumed osteolysis from metal-on-metal debris bilaterally    No signs of any vincent fracture related to subsidence of the prosthesis or loss of fixation of the acetabulum  Lab tests are unavailable for sedimentation rate or C-reactive protein but these were ordered. We have recommended recheck in another 2 weeks to see that the fracture has not migrated. Ultimately once the fracture heals revision hip arthroplasty may be necessary to finalize the metal-on-metal debris and limited its extension into the acetabulum and femoral bone    Right knee unfortunately has major lateral joint line loss with bone on bone changes and valgus angulation that is apparent clinically as well. Potential for additional knee arthroplasty noted in the future. Follow now for continued bone healing across the migrated greater troch fracture.              Claudette Rang, MD

## 2022-04-27 ENCOUNTER — OFFICE VISIT (OUTPATIENT)
Dept: ORTHOPEDIC SURGERY | Age: 62
End: 2022-04-27
Payer: MEDICARE

## 2022-04-27 ENCOUNTER — TELEPHONE (OUTPATIENT)
Dept: ORTHOPEDIC SURGERY | Age: 62
End: 2022-04-27

## 2022-04-27 VITALS — BODY MASS INDEX: 25.18 KG/M2 | HEIGHT: 73 IN | WEIGHT: 190 LBS

## 2022-04-27 DIAGNOSIS — M25.561 RIGHT KNEE PAIN, UNSPECIFIED CHRONICITY: ICD-10-CM

## 2022-04-27 DIAGNOSIS — S72.351D CLOSED DISPLACED COMMINUTED FRACTURE OF SHAFT OF RIGHT FEMUR WITH ROUTINE HEALING, SUBSEQUENT ENCOUNTER: Primary | ICD-10-CM

## 2022-04-27 PROCEDURE — 3017F COLORECTAL CA SCREEN DOC REV: CPT | Performed by: ORTHOPAEDIC SURGERY

## 2022-04-27 PROCEDURE — 4004F PT TOBACCO SCREEN RCVD TLK: CPT | Performed by: ORTHOPAEDIC SURGERY

## 2022-04-27 PROCEDURE — 99214 OFFICE O/P EST MOD 30 MIN: CPT | Performed by: ORTHOPAEDIC SURGERY

## 2022-04-27 PROCEDURE — G8427 DOCREV CUR MEDS BY ELIG CLIN: HCPCS | Performed by: ORTHOPAEDIC SURGERY

## 2022-04-27 PROCEDURE — G8419 CALC BMI OUT NRM PARAM NOF/U: HCPCS | Performed by: ORTHOPAEDIC SURGERY

## 2022-04-27 RX ORDER — IBUPROFEN 800 MG/1
800 TABLET ORAL 2 TIMES DAILY
Qty: 120 TABLET | Refills: 2 | Status: ON HOLD | OUTPATIENT
Start: 2022-04-27 | End: 2022-05-27 | Stop reason: HOSPADM

## 2022-04-27 NOTE — LETTER
[address]  Surgery Precert & Billing Form:    DEMOGRAPHICS:                                                                                                       Patient Name:  Vega Quiroga  Patient :  1960   Patient SS#:      Patient Phone:  [phone] (home)  Alt.  Patient Phone:    Patient Address:  [address] [address]    PCP:  Jarred Chandra DO  Insurance: [facility] Medicare    DIAGNOSIS & PROCEDURE:                                                                                      Diagnosis: Failed right total hip arthroplasty, right knee osteoarthritis T84.090A, M17.11  Operation: right    SURGERY  INFORMATION  Date of Surgery:   22  Location:    [][facility] [place]      [x][facility]      [][facility]     []____________  Type:    Inpatient  23 hour hold:  No  Surgeon:     Daya Hsieh MD      22     BILLING INFORMATION:                                                                                                Physician Procedure                                              Right total hip revision arthroplasty, right knee geniculate nerve block with intra articular injection under fluoroscopy guidance      CPT Codes        34302, 48350, [facility] [place]  Assist    [x]Yes    []No

## 2022-04-28 ENCOUNTER — TELEPHONE (OUTPATIENT)
Dept: ORTHOPEDIC SURGERY | Age: 62
End: 2022-04-28

## 2022-04-28 DIAGNOSIS — M16.0 PRIMARY OSTEOARTHRITIS OF BOTH HIPS: ICD-10-CM

## 2022-04-29 NOTE — TELEPHONE ENCOUNTER
Auth: # [id number]    Date: 05/27/22  Type of SX:  INPATIENT  Location: [facility]  CPT: [id number], [id number], [address]    SX area: [address] [place] [address]

## 2022-04-29 NOTE — PROGRESS NOTES
Jimbo Buerger    Age 65 y.o.    male    1960    MRN [id number]    5/27/2022  Arrival Time_____________  OR Time____________[address]     Procedure(s):  REVISION RIGHT TOTAL HIP ARTHROPLASTY          RICKI Méndez Ma  RIGHT KNEE GENICULAR NERVE BLOCK WITH INTRA ARTICULAR INJECTION UNDER FLUOROSCOPIC GUIDANCE                      General   Surgeon(s):  Ashlie Buitrago, MD      DAY ADMIT ___  SDS/OP ___  OUTPT IN BED ___        Phone [phone] (home)     PCP _____________________ Phone_________________ Epic ( ) Epic CE ( ) Appt ________    ADDITIONAL INFO __________________________________ Cardio/Consult _____________    NOTES _____________________________________________________________________    ____________________________________________________________________________    PAT APPT DATE:________ TIME: ________  FAXED QAD: _______  (__) H&P w/ Hospitalist  __________________________________________________________________________  Preop Nurse phone screen complete: _____________  (__) CBC     (__) W/ DIFF ___________     (__) Hgb A1C    ___________  (__) CHEST X RAY   __________  (__) LIPID PROFILE  ___________  (__) EKG   __________  (__) PT/PTT   ___________  (__) PFT's   __________  (__) BMP   ___________  (__) CAROTIDS  __________  (__) CMP   ___________  (__) VEIN MAPPING  __________  (__) U/A   ___________  (__) HISTORY & PHYSICAL __________  (__) URINE C & S  ___________  (__) CARDIAC CLEARANCE __________  (__) U/A W/ FLEX  ___________  (__) PULM.  CLEARANCE __________  (__) SERUM PREGNANCY ___________  (__) Check Epic DOS orders __________  (__) TYPE & SCREEN __________repeat ( ) (__)  __________________ __________  (__) ALBUMIN Estrellita Marjan ___________  (__)  __________________ __________  (__) TRANSFERRIN  ___________  (__)  __________________ __________  (__) LIVER PROFILE  ___________  (__)  __________________ __________  (__) MRSA NASAL SWAB ___________  (__) URINE PREG DOS __________  (__) SED RATE  ___________  (__) BLOOD SUGAR DOS __________  (__) C-REACTIVE PROTEIN ___________    (__) VITAMIN D HYDROXY ___________  (__) BLOOD THINNERS __________    (__) ACE/ ARBS: _____________________     (__) BETABLOCKERS __________________

## 2022-05-02 DIAGNOSIS — M25.561 RIGHT KNEE PAIN, UNSPECIFIED CHRONICITY: ICD-10-CM

## 2022-05-02 DIAGNOSIS — S72.351D CLOSED DISPLACED COMMINUTED FRACTURE OF SHAFT OF RIGHT FEMUR WITH ROUTINE HEALING, SUBSEQUENT ENCOUNTER: ICD-10-CM

## 2022-05-02 LAB
A/G RATIO: 1.4 (ref 1.1–2.2)
ALBUMIN SERPL-MCNC: 4.6 G/DL (ref 3.4–5)
ALP BLD-CCNC: 45 U/L (ref 40–129)
ALT SERPL-CCNC: 32 U/L (ref 10–40)
ANION GAP SERPL CALCULATED.3IONS-SCNC: 12 MMOL/L (ref 3–16)
AST SERPL-CCNC: 37 U/L (ref 15–37)
BILIRUB SERPL-MCNC: 0.3 MG/DL (ref 0–1)
BUN BLDV-MCNC: 16 MG/DL (ref 7–20)
CALCIUM SERPL-MCNC: 9.8 MG/DL (ref 8.3–10.6)
CHLORIDE BLD-SCNC: 104 MMOL/L (ref 99–110)
CO2: 23 MMOL/L (ref 21–32)
CREAT SERPL-MCNC: 0.8 MG/DL (ref 0.8–1.3)
GFR AFRICAN AMERICAN: >60
GFR NON-AFRICAN AMERICAN: >60
GLUCOSE BLD-MCNC: 102 MG/DL (ref 70–99)
HCT VFR BLD CALC: 39.1 % (ref 40.5–52.5)
HEMOGLOBIN: 13.1 G/DL (ref 13.5–17.5)
MCH RBC QN AUTO: 28.6 PG (ref 26–34)
MCHC RBC AUTO-ENTMCNC: 33.4 G/DL (ref 31–36)
MCV RBC AUTO: 85.7 FL (ref 80–100)
PDW BLD-RTO: 15.2 % (ref 12.4–15.4)
PLATELET # BLD: 190 K/UL (ref 135–450)
PMV BLD AUTO: 7.8 FL (ref 5–10.5)
POTASSIUM SERPL-SCNC: 3.8 MMOL/L (ref 3.5–5.1)
RBC # BLD: 4.57 M/UL (ref 4.2–5.9)
SEDIMENTATION RATE, ERYTHROCYTE: 14 MM/HR (ref 0–20)
SODIUM BLD-SCNC: 139 MMOL/L (ref 136–145)
TOTAL PROTEIN: 8 G/DL (ref 6.4–8.2)
WBC # BLD: 4.6 K/UL (ref 4–11)

## 2022-05-03 RX ORDER — NALOXONE HYDROCHLORIDE 4 MG/.1ML
1 SPRAY NASAL PRN
Qty: 1 EACH | Refills: 5 | Status: SHIPPED | OUTPATIENT
Start: 2022-05-03 | End: 2022-05-23

## 2022-05-03 RX ORDER — OXYCODONE AND ACETAMINOPHEN 10; 325 MG/1; MG/1
1 TABLET ORAL EVERY 8 HOURS PRN
Qty: 40 TABLET | Refills: 0 | Status: ON HOLD | OUTPATIENT
Start: 2022-05-03 | End: 2022-05-27 | Stop reason: SDUPTHER

## 2022-05-04 NOTE — PROGRESS NOTES
Patient Name: Eulalia Cadet  : 1960  DOS: 2022        Chief Complaint:   Chief Complaint   Patient presents with    Leg Pain     RIGHT LEG-FX GREATER [address]       More pain in the right knee over the past several weeks. Off crutches recently    Pain has been changing but he is feeling less pain overall with improved walking with regards to the hip on the right. History of Present Illness:  Eulalia Cadet is a 65 y.o. male who presents with a chief complaint of that of right hip pain. He said a history of polymyalgia rheumatica with AVN of both shoulders and both hips. He has been managing work details until just recently when he underwent evaluations and resulted in permanent disability. He is felt that he has had intermittent subluxations of both his hips and 1 week plus ago he fell while in his garage directly onto his right hip was seen at [facility] ED on 2021 and noted to have a greater trochanter fracture. He has been limited to wheelchair crutch and walker ambulation which is very unusual for him. His pain levels are so significant that they to him resemble what he had prior to his surgical interventions originally. He has not had blood test since his original intervention more because of apprehensions regarding potential problems and also insurance related issues. Medical History  Current Medications:   Prior to Admission medications    Medication Sig Start Date End Date Taking? Authorizing Provider   ibuprofen (ADVIL;MOTRIN) 800 MG tablet Take 1 tablet by mouth 2 times daily 22  Yes Vannesa Brady MD   oxyCODONE-acetaminophen (PERCOCET)  MG per tablet Take 1 tablet by mouth every 8 hours as needed for Pain for up to 30 days.  Intended supply: 30 days 5/3/22 6/2/22  Vannesa Brady MD   naloxone 4 MG/0.1ML LIQD nasal spray 1 spray by Nasal route as needed for Opioid Reversal 5/3/22   Vannesa Brady MD   tiZANidine (ZANAFLEX) 4 MG tablet Take 1 tablet by mouth 4 times daily as needed (muscle spasm) 1/12/22   Bayron Massey MD   triamcinolone (KENALOG) 0.1 % cream Apply topically 2 times daily. 12/12/17   Renata Godoy DO   methylphenidate (RITALIN) 10 MG tablet Take 10 mg by mouth    Historical Provider, MD   methadone (DOLOPHINE) 10 MG tablet Take 10 mg by mouth 2 times daily    Historical Provider, MD     Allergies: No Known Allergies  Past Medical History:   Diagnosis Date    Foot pain     bilat - gotten worse    Osteoarthritis      Social History     Socioeconomic History    Marital status: Single     Spouse name: Not on file    Number of children: Not on file    Years of education: Not on file    Highest education level: Not on file   Occupational History    Not on file   Tobacco Use    Smoking status: Current Some Day Smoker    Smokeless tobacco: Never Used    Tobacco comment: infrequently smokes a pipe or cigarette - hasn't had any since September 17   Substance and Sexual Activity    Alcohol use: Yes     Alcohol/week: 0.0 standard drinks     Comment: occassional    Drug use: Not on file    Sexual activity: Yes     Partners: Female   Other Topics Concern    Not on file   Social History Narrative    Not on file     Social Determinants of Health     Financial Resource Strain:     Difficulty of Paying Living Expenses: Not on file   Food Insecurity:     Worried About Running Out of Food in the Last Year: Not on file    Denae of Food in the Last Year: Not on file   Transportation Needs:     Lack of Transportation (Medical): Not on file    Lack of Transportation (Non-Medical):  Not on file   Physical Activity:     Days of Exercise per Week: Not on file    Minutes of Exercise per Session: Not on file   Stress:     Feeling of Stress : Not on file   Social Connections:     Frequency of Communication with Friends and Family: Not on file    Frequency of Social Gatherings with Friends and Family: Not on file    Attends Jain Services: Not on file    Active Member of Clubs or Organizations: Not on file    Attends Club or Organization Meetings: Not on file    Marital Status: Not on file   Intimate Partner Violence:     Fear of Current or Ex-Partner: Not on file    Emotionally Abused: Not on file    Physically Abused: Not on file    Sexually Abused: Not on file   Housing Stability:     Unable to Pay for Housing in the Last Year: Not on file    Number of [place] in the Last Year: Not on file    Unstable Housing in the Last Year: Not on file     No family history on file. Review of Systems:   Review of Systems   Constitutional: Negative for chills and fever. HENT: Negative for nosebleeds. Eyes: Negative for double vision. Cardiovascular: Negative for chest pain. Gastrointestinal: Negative for abdominal pain. Musculoskeletal: Positive for joint pain and myalgias. Skin: Negative for rash. Neurological: Negative for seizures. Psychiatric/Behavioral: Negative for hallucinations. All other systems reviewed and negative     Assessment   Vital Signs:   Ht 6' 1\" (1.854 m)   Wt 190 lb (86.2 kg)   BMI 25.07 kg/m²     Physical Exam   Constitutional: Patient is oriented to person, place, and time and well-developed, well-nourished, and in no distress. HENT:   Head: Normocephalic and atraumatic. Eyes: Pupils are equal, round, and reactive to light. Neck: No tracheal deviation present. No thyromegaly present. Pulmonary/Chest: Effort normal.   Abdominal: Soft. There is no guarding. Musculoskeletal: Patient exhibits tenderness and pain. Neurological: Patient is alert and oriented to person, place, and time. Skin: Skin is warm. Psychiatric: Affect normal.       Right Hip Examination    Inspection: There is no swelling or ecchymosis. There is no obvious deformity. Palpation: There is tenderness over the greater trochanter. There is anterior groin tenderness.      Range of Motion: [id number] ER10  IR10 limited secondary to pain. Bruising of the anterior and psoterior thigh    Strength: Hip flexors rated: 4/5. Special Tests: Trendelenberg sign: positive       Skin: There are no rashes, ulcerations or lesions. Gait: Patient walks with a limp. Flexion and abduction with some weakness but improved. Skin shows no rashes/ecchymosis to the affected area, no hyperesthesias, no discoloration, no temperature or color discrepancies. NEUROLOGICALLY: There is no evidence for sensory or motor deficits in the extremity. Coordination appears full with no spacticity or rigidity. Reflexes appear to be symmetric. Distal circulation intact. No signs of RSD. Knee on the same side with valgus angulation and pain lateral joint. pseudolaxity  but correction to neutral.       Additional Comments:     Diagnostic Test Findings: New x-rays were obtained of the right hip with some evaluation of the left hip noting metal-on-metal implants and fracture of the greater trochanter with some displacement and now proximal migration  Subluxation of the head noted on ap supine film with reduction upon roation of the leg. Procedure(s): None    Assessment and Plan:  1. Closed displaced comminuted fracture of shaft of right femur with routine healing, subsequent encounter    2. Right knee pain, unspecified chronicity        No follow-ups on file.     The orders below, if any, were placed during this visit:   Orders Placed This Encounter   Procedures    Comprehensive Metabolic Panel     Standing Status:   Future     Number of Occurrences:   1     Standing Expiration Date:   4/27/2023    CBC     Standing Status:   Future     Number of Occurrences:   1     Standing Expiration Date:   4/28/2023    Sedimentation Rate     Standing Status:   Future     Number of Occurrences:   1     Standing Expiration Date:   4/27/2023       Impression:   @WES@    Treatment Plan: Fracture noted of the greater trochanter right total hip arthroplasty with presumed osteolysis from metal-on-metal debris bilaterally    No signs of any vincent fracture related to subsidence of the prosthesis or loss of fixation of the acetabulum  Lab tests are unavailable for sedimentation rate or C-reactive protein but these were ordered. We have recommended recheck in another 2 weeks to see that the fracture has not migrated. Ultimately once the fracture heals revision hip arthroplasty may be necessary to finalize the metal-on-metal debris and limited its extension into the acetabulum and femoral bone    Right knee unfortunately has major lateral joint line loss with bone on bone changes and valgus angulation that is apparent clinically as well. Potential for additional knee arthroplasty noted in the future. Follow now for continued bone healing across the migrated greater troch fracture. At this point we have discussed proceeding with blood tests and progression to total hip arthroplasty revision on the right side this in addition to geniculate block and PRP in the right knee may allow him to manage better with the right leg overall. The vincent subluxation of his right hip and left hip indicate the potential for significant metal debris in both his hips.   He has been unwilling to do blood test at this point but pending surgical intervention he is agreed to proceed with at least an evaluation and potential treatment algorithm that would extend out over the next several months         Daya Hsieh MD

## 2022-05-05 ENCOUNTER — TELEPHONE (OUTPATIENT)
Dept: ORTHOPEDIC SURGERY | Age: 62
End: 2022-05-05

## 2022-05-16 ENCOUNTER — ANESTHESIA EVENT (OUTPATIENT)
Dept: OPERATING ROOM | Age: 62
End: 2022-05-16
Payer: MEDICARE

## 2022-05-17 ENCOUNTER — TELEPHONE (OUTPATIENT)
Dept: ORTHOPEDIC SURGERY | Age: 62
End: 2022-05-17

## 2022-05-17 NOTE — TELEPHONE ENCOUNTER
Per Evonne Duenas this patient did not have any of his pre admission testing completed today but she was able to work with the patient's PCP office to get ALL of this done at the appointment tomorrow morning for the patient so no need to ask Dr. Kae Waddell for anything further as discussed with Will earlier today. Thanks.

## 2022-05-17 NOTE — TELEPHONE ENCOUNTER
LABS THAT WERE DONE ON 5/2 ARE NOT THE COMPLETE LIST OF LABS ORDER, PATIENT IS SCHEDULED TO SEE PRIMARY CARE TOMORROW FOR PRE OP SO IF HE NEEDS MORE TESTING HE WILL BE THERE AT THAT TIME TO GET IT DONE. PLEASE CALL WILL W/ MORE DETAILS ON LABS ORDERED.

## 2022-05-18 ENCOUNTER — TELEPHONE (OUTPATIENT)
Dept: ORTHOPEDIC SURGERY | Age: 62
End: 2022-05-18

## 2022-05-18 DIAGNOSIS — S72.351D CLOSED DISPLACED COMMINUTED FRACTURE OF SHAFT OF RIGHT FEMUR WITH ROUTINE HEALING, SUBSEQUENT ENCOUNTER: Primary | ICD-10-CM

## 2022-05-18 DIAGNOSIS — M16.0 PRIMARY OSTEOARTHRITIS OF BOTH HIPS: ICD-10-CM

## 2022-05-18 NOTE — TELEPHONE ENCOUNTER
ORTHOPAEDIC NURSE NAVIGATOR SUMMARY NOTE    RN unable to speak with pt prior to surgery- RN tried multiple times. Anticipated Date of Surgery: 5/27/22    Recieved Pre-Op Education: yes   In person class:No  Pt used educational link:Yes   Pt completed pre and post test to measure learning:Yes    If pt did not complete either, why not? N/A      PCP: Gennaro Arcos DO   Phone #: [phone]    Date of PCP Visit for H&P: 5/18/22    Any Noted Concerns from PCP prior to surgery:  Yes PCP ordered chest x ray due to smoking history. As of 5/25 pt has not gotten chest xray. PCP stated on H and P that he would not give clearance unless pt got xray. States if anesthesia and surgeon ok with it that is their decision. Message sent to office. If Yes, what concerns?:    IS THE PATIENT IN A PAIN MANAGEMENT PROGRAM?:   Not Applicable     Review of Past Medical History Reveals History of:      Critical Lab Values:   Hgb/Hct:   Hemoglobin (g/dL)   Date Value   05/02/2022 13.1 (L)   /  Hematocrit (%)   Date Value   05/02/2022 39.1 (L)      HgbA1C:  No results found for: LABA1C   Albumin:    Lab Results   Component Value Date    LABALBU 4.6 05/02/2022      BUN/Cr:   BUN (mg/dL)   Date Value   05/02/2022 16   /  CREATININE (mg/dL)   Date Value   05/02/2022 0.8      BMI:    BMI Readings from Last 1 Encounters:   04/27/22 25.07 kg/m²        Coronary Artery Disease/HTN/CHF History: No      Cardiologist:     Cardiac Clearance Necessary: No    Date of Cardiac Clearance Appt: On Plavix? No,  If YES, when will they stop taking?     Final Cardiac Recommendations:N/A   -On any anticoagulation-       Diabetes History: No    Most Recent HgbA1C:     PCP or Endocrine Recommendations:     Nutritionist/Dietician Consult Scheduled: N/A    Final Plan For Diabetic Control: N/A   Pulmonary: COPD/Emphysema/ Use of home oxygen:      Alcohol use:        DVT Risk Stratification:  Low      Vascular Consult Ordered:  no    Date of Vascular Appt:

## 2022-05-27 ENCOUNTER — APPOINTMENT (OUTPATIENT)
Dept: GENERAL RADIOLOGY | Age: 62
End: 2022-05-27
Attending: ORTHOPAEDIC SURGERY
Payer: MEDICARE

## 2022-05-27 ENCOUNTER — ANESTHESIA (OUTPATIENT)
Dept: OPERATING ROOM | Age: 62
End: 2022-05-27
Payer: MEDICARE

## 2022-05-27 ENCOUNTER — HOSPITAL ENCOUNTER (OUTPATIENT)
Age: 62
Setting detail: OBSERVATION
Discharge: HOME | End: 2022-05-28
Attending: ORTHOPAEDIC SURGERY | Admitting: ORTHOPAEDIC SURGERY
Payer: MEDICARE

## 2022-05-27 DIAGNOSIS — T84.090A OTHER MECHANICAL COMPLICATION OF INTERNAL RIGHT HIP PROSTHESIS, INITIAL ENCOUNTER (HCC): ICD-10-CM

## 2022-05-27 DIAGNOSIS — M17.11 PRIMARY OSTEOARTHRITIS OF RIGHT KNEE: ICD-10-CM

## 2022-05-27 DIAGNOSIS — Z96.649 STATUS POST REVISION OF TOTAL HIP REPLACEMENT: Primary | ICD-10-CM

## 2022-05-27 DIAGNOSIS — M16.0 PRIMARY OSTEOARTHRITIS OF BOTH HIPS: ICD-10-CM

## 2022-05-27 LAB
ABO/RH: NORMAL
ANTIBODY SCREEN: NORMAL
APPEARANCE FLUID: NORMAL
APTT: 27.5 SEC (ref 23–34.3)
C-REACTIVE PROTEIN: <3 MG/L (ref 0–5.1)
CELL COUNT FLUID TYPE: NORMAL
CLOT EVALUATION: NORMAL
COLOR FLUID: NORMAL
EOSINOPHIL FLUID: 3 %
ESTIMATED AVERAGE GLUCOSE: 102.5 MG/DL
HBA1C MFR BLD: 5.2 %
INR BLD: 1.02 (ref 0.87–1.14)
LYMPHOCYTES, BODY FLUID: 36 %
MACROPHAGE FLUID: 40 %
NEUTROPHIL, FLUID: 21 %
NUCLEATED CELLS FLUID: 222 /CUMM
NUMBER OF CELLS COUNTED FLUID: 100
PROTHROMBIN TIME: 13.2 SEC (ref 11.7–14.5)
RBC FLUID: NORMAL /CUMM

## 2022-05-27 PROCEDURE — 85730 THROMBOPLASTIN TIME PARTIAL: CPT

## 2022-05-27 PROCEDURE — 27134 REVISE HIP JOINT REPLACEMENT: CPT | Performed by: ORTHOPAEDIC SURGERY

## 2022-05-27 PROCEDURE — 73560 X-RAY EXAM OF KNEE 1 OR 2: CPT

## 2022-05-27 PROCEDURE — 2709999900 HC NON-CHARGEABLE SUPPLY: Performed by: ORTHOPAEDIC SURGERY

## 2022-05-27 PROCEDURE — 97161 PT EVAL LOW COMPLEX 20 MIN: CPT

## 2022-05-27 PROCEDURE — 88331 PATH CONSLTJ SURG 1 BLK 1SPC: CPT

## 2022-05-27 PROCEDURE — 97110 THERAPEUTIC EXERCISES: CPT

## 2022-05-27 PROCEDURE — 97116 GAIT TRAINING THERAPY: CPT

## 2022-05-27 PROCEDURE — 86900 BLOOD TYPING SEROLOGIC ABO: CPT

## 2022-05-27 PROCEDURE — 87070 CULTURE OTHR SPECIMN AEROBIC: CPT

## 2022-05-27 PROCEDURE — 72170 X-RAY EXAM OF PELVIS: CPT

## 2022-05-27 PROCEDURE — 2500000003 HC RX 250 WO HCPCS: Performed by: ORTHOPAEDIC SURGERY

## 2022-05-27 PROCEDURE — 87116 MYCOBACTERIA CULTURE: CPT

## 2022-05-27 PROCEDURE — 3700000001 HC ADD 15 MINUTES (ANESTHESIA): Performed by: ORTHOPAEDIC SURGERY

## 2022-05-27 PROCEDURE — 97530 THERAPEUTIC ACTIVITIES: CPT

## 2022-05-27 PROCEDURE — 3600000005 HC SURGERY LEVEL 5 BASE: Performed by: ORTHOPAEDIC SURGERY

## 2022-05-27 PROCEDURE — 6370000000 HC RX 637 (ALT 250 FOR IP): Performed by: ORTHOPAEDIC SURGERY

## 2022-05-27 PROCEDURE — 2500000003 HC RX 250 WO HCPCS: Performed by: ANESTHESIOLOGY

## 2022-05-27 PROCEDURE — 27134 REVISE HIP JOINT REPLACEMENT: CPT | Performed by: PHYSICIAN ASSISTANT

## 2022-05-27 PROCEDURE — 7100000001 HC PACU RECOVERY - ADDTL 15 MIN: Performed by: ORTHOPAEDIC SURGERY

## 2022-05-27 PROCEDURE — 86140 C-REACTIVE PROTEIN: CPT

## 2022-05-27 PROCEDURE — 6370000000 HC RX 637 (ALT 250 FOR IP): Performed by: PHYSICIAN ASSISTANT

## 2022-05-27 PROCEDURE — 86850 RBC ANTIBODY SCREEN: CPT

## 2022-05-27 PROCEDURE — 2580000003 HC RX 258: Performed by: ORTHOPAEDIC SURGERY

## 2022-05-27 PROCEDURE — 3209999900 FLUORO FOR SURGICAL PROCEDURES

## 2022-05-27 PROCEDURE — 2580000003 HC RX 258: Performed by: ANESTHESIOLOGY

## 2022-05-27 PROCEDURE — G0378 HOSPITAL OBSERVATION PER HR: HCPCS

## 2022-05-27 PROCEDURE — 83036 HEMOGLOBIN GLYCOSYLATED A1C: CPT

## 2022-05-27 PROCEDURE — 6360000002 HC RX W HCPCS

## 2022-05-27 PROCEDURE — 97535 SELF CARE MNGMENT TRAINING: CPT

## 2022-05-27 PROCEDURE — 86901 BLOOD TYPING SEROLOGIC RH(D): CPT

## 2022-05-27 PROCEDURE — 87015 SPECIMEN INFECT AGNT CONCNTJ: CPT

## 2022-05-27 PROCEDURE — 6360000002 HC RX W HCPCS: Performed by: ORTHOPAEDIC SURGERY

## 2022-05-27 PROCEDURE — 3700000000 HC ANESTHESIA ATTENDED CARE: Performed by: ORTHOPAEDIC SURGERY

## 2022-05-27 PROCEDURE — 87075 CULTR BACTERIA EXCEPT BLOOD: CPT

## 2022-05-27 PROCEDURE — C1713 ANCHOR/SCREW BN/BN,TIS/BN: HCPCS | Performed by: ORTHOPAEDIC SURGERY

## 2022-05-27 PROCEDURE — 89051 BODY FLUID CELL COUNT: CPT

## 2022-05-27 PROCEDURE — 2720000010 HC SURG SUPPLY STERILE: Performed by: ORTHOPAEDIC SURGERY

## 2022-05-27 PROCEDURE — 97165 OT EVAL LOW COMPLEX 30 MIN: CPT

## 2022-05-27 PROCEDURE — 2500000003 HC RX 250 WO HCPCS: Performed by: NURSE ANESTHETIST, CERTIFIED REGISTERED

## 2022-05-27 PROCEDURE — 2580000003 HC RX 258: Performed by: PHYSICIAN ASSISTANT

## 2022-05-27 PROCEDURE — 7100000000 HC PACU RECOVERY - FIRST 15 MIN: Performed by: ORTHOPAEDIC SURGERY

## 2022-05-27 PROCEDURE — 88305 TISSUE EXAM BY PATHOLOGIST: CPT

## 2022-05-27 PROCEDURE — 6360000002 HC RX W HCPCS: Performed by: NURSE ANESTHETIST, CERTIFIED REGISTERED

## 2022-05-27 PROCEDURE — 85610 PROTHROMBIN TIME: CPT

## 2022-05-27 PROCEDURE — 6360000002 HC RX W HCPCS: Performed by: PHYSICIAN ASSISTANT

## 2022-05-27 PROCEDURE — 87205 SMEAR GRAM STAIN: CPT

## 2022-05-27 PROCEDURE — 71045 X-RAY EXAM CHEST 1 VIEW: CPT

## 2022-05-27 PROCEDURE — 3600000015 HC SURGERY LEVEL 5 ADDTL 15MIN: Performed by: ORTHOPAEDIC SURGERY

## 2022-05-27 PROCEDURE — 87102 FUNGUS ISOLATION CULTURE: CPT

## 2022-05-27 PROCEDURE — C1776 JOINT DEVICE (IMPLANTABLE): HCPCS | Performed by: ORTHOPAEDIC SURGERY

## 2022-05-27 PROCEDURE — A4217 STERILE WATER/SALINE, 500 ML: HCPCS | Performed by: ORTHOPAEDIC SURGERY

## 2022-05-27 PROCEDURE — 87206 SMEAR FLUORESCENT/ACID STAI: CPT

## 2022-05-27 DEVICE — SLEEVE FEM +3MM OFFSET HIP TYP 1 TAPR OPT HD ACT ARTC 2: Type: IMPLANTABLE DEVICE | Site: HIP | Status: FUNCTIONAL

## 2022-05-27 DEVICE — IMPLANTABLE DEVICE
Type: IMPLANTABLE DEVICE | Site: HIP | Status: FUNCTIONAL
Brand: G7 ACETABULAR LINER

## 2022-05-27 DEVICE — IMPLANTABLE DEVICE
Type: IMPLANTABLE DEVICE | Site: HIP | Status: FUNCTIONAL
Brand: G7® ACETABULAR SYSTEM

## 2022-05-27 DEVICE — IMPLANTABLE DEVICE: Type: IMPLANTABLE DEVICE | Site: HIP | Status: FUNCTIONAL

## 2022-05-27 DEVICE — HEAD FEM DIA44MM HIP BIOLOX DELT OPT TYP 1 TAPR G7: Type: IMPLANTABLE DEVICE | Site: HIP | Status: FUNCTIONAL

## 2022-05-27 DEVICE — GRAFT BNE CRUSH 15 CC CANC: Type: IMPLANTABLE DEVICE | Site: HIP | Status: FUNCTIONAL

## 2022-05-27 DEVICE — IMPLANTABLE DEVICE
Type: IMPLANTABLE DEVICE | Site: HIP | Status: FUNCTIONAL
Brand: CERAMENT™BONE VOID FILLER

## 2022-05-27 DEVICE — DEVICE REATTACHMENT L50MM DIA1MM STD TROCHANTERIC TI W/ CO: Type: IMPLANTABLE DEVICE | Site: HIP | Status: FUNCTIONAL

## 2022-05-27 RX ORDER — SODIUM CHLORIDE 9 MG/ML
INJECTION, SOLUTION INTRAVENOUS PRN
Status: DISCONTINUED | OUTPATIENT
Start: 2022-05-27 | End: 2022-05-27 | Stop reason: HOSPADM

## 2022-05-27 RX ORDER — MORPHINE SULFATE 2 MG/ML
1 INJECTION, SOLUTION INTRAMUSCULAR; INTRAVENOUS
Status: DISCONTINUED | OUTPATIENT
Start: 2022-05-27 | End: 2022-05-28 | Stop reason: HOSPADM

## 2022-05-27 RX ORDER — MORPHINE SULFATE 2 MG/ML
2 INJECTION, SOLUTION INTRAMUSCULAR; INTRAVENOUS EVERY 4 HOURS PRN
Status: DISCONTINUED | OUTPATIENT
Start: 2022-05-27 | End: 2022-05-27

## 2022-05-27 RX ORDER — SODIUM CHLORIDE 9 MG/ML
INJECTION, SOLUTION INTRAVENOUS PRN
Status: DISCONTINUED | OUTPATIENT
Start: 2022-05-27 | End: 2022-05-28 | Stop reason: HOSPADM

## 2022-05-27 RX ORDER — TRANEXAMIC ACID 100 MG/ML
15 INJECTION, SOLUTION INTRAVENOUS
Status: COMPLETED | OUTPATIENT
Start: 2022-05-27 | End: 2022-05-27

## 2022-05-27 RX ORDER — SODIUM CHLORIDE 9 MG/ML
25 INJECTION, SOLUTION INTRAVENOUS PRN
Status: DISCONTINUED | OUTPATIENT
Start: 2022-05-27 | End: 2022-05-27 | Stop reason: HOSPADM

## 2022-05-27 RX ORDER — ONDANSETRON 4 MG/1
4 TABLET, ORALLY DISINTEGRATING ORAL EVERY 8 HOURS PRN
Status: DISCONTINUED | OUTPATIENT
Start: 2022-05-27 | End: 2022-05-28 | Stop reason: HOSPADM

## 2022-05-27 RX ORDER — OXYCODONE HYDROCHLORIDE 5 MG/1
10 TABLET ORAL PRN
Status: DISCONTINUED | OUTPATIENT
Start: 2022-05-27 | End: 2022-05-27 | Stop reason: HOSPADM

## 2022-05-27 RX ORDER — ONDANSETRON 2 MG/ML
4 INJECTION INTRAMUSCULAR; INTRAVENOUS
Status: DISCONTINUED | OUTPATIENT
Start: 2022-05-27 | End: 2022-05-27 | Stop reason: HOSPADM

## 2022-05-27 RX ORDER — OXYCODONE HYDROCHLORIDE 5 MG/1
10 TABLET ORAL EVERY 4 HOURS PRN
Status: DISCONTINUED | OUTPATIENT
Start: 2022-05-27 | End: 2022-05-27 | Stop reason: SDUPTHER

## 2022-05-27 RX ORDER — PHENYLEPHRINE HCL IN 0.9% NACL 1 MG/10 ML
SYRINGE (ML) INTRAVENOUS PRN
Status: DISCONTINUED | OUTPATIENT
Start: 2022-05-27 | End: 2022-05-27 | Stop reason: SDUPTHER

## 2022-05-27 RX ORDER — SODIUM CHLORIDE, SODIUM LACTATE, POTASSIUM CHLORIDE, CALCIUM CHLORIDE 600; 310; 30; 20 MG/100ML; MG/100ML; MG/100ML; MG/100ML
INJECTION, SOLUTION INTRAVENOUS CONTINUOUS
Status: DISCONTINUED | OUTPATIENT
Start: 2022-05-27 | End: 2022-05-27 | Stop reason: HOSPADM

## 2022-05-27 RX ORDER — HYDROMORPHONE HCL 110MG/55ML
PATIENT CONTROLLED ANALGESIA SYRINGE INTRAVENOUS PRN
Status: DISCONTINUED | OUTPATIENT
Start: 2022-05-27 | End: 2022-05-27 | Stop reason: SDUPTHER

## 2022-05-27 RX ORDER — ONDANSETRON 2 MG/ML
4 INJECTION INTRAMUSCULAR; INTRAVENOUS EVERY 6 HOURS PRN
Status: DISCONTINUED | OUTPATIENT
Start: 2022-05-27 | End: 2022-05-28 | Stop reason: HOSPADM

## 2022-05-27 RX ORDER — LABETALOL HYDROCHLORIDE 5 MG/ML
10 INJECTION, SOLUTION INTRAVENOUS
Status: DISCONTINUED | OUTPATIENT
Start: 2022-05-27 | End: 2022-05-27 | Stop reason: HOSPADM

## 2022-05-27 RX ORDER — MEPERIDINE HYDROCHLORIDE 50 MG/ML
12.5 INJECTION INTRAMUSCULAR; INTRAVENOUS; SUBCUTANEOUS EVERY 5 MIN PRN
Status: DISCONTINUED | OUTPATIENT
Start: 2022-05-27 | End: 2022-05-27 | Stop reason: HOSPADM

## 2022-05-27 RX ORDER — SODIUM CHLORIDE 0.9 % (FLUSH) 0.9 %
5-40 SYRINGE (ML) INJECTION PRN
Status: DISCONTINUED | OUTPATIENT
Start: 2022-05-27 | End: 2022-05-28 | Stop reason: HOSPADM

## 2022-05-27 RX ORDER — OXYCODONE HYDROCHLORIDE 5 MG/1
5 TABLET ORAL PRN
Status: DISCONTINUED | OUTPATIENT
Start: 2022-05-27 | End: 2022-05-27 | Stop reason: HOSPADM

## 2022-05-27 RX ORDER — SODIUM CHLORIDE 0.9 % (FLUSH) 0.9 %
5-40 SYRINGE (ML) INJECTION EVERY 12 HOURS SCHEDULED
Status: DISCONTINUED | OUTPATIENT
Start: 2022-05-27 | End: 2022-05-27 | Stop reason: HOSPADM

## 2022-05-27 RX ORDER — CEFADROXIL 500 MG/1
500 CAPSULE ORAL 2 TIMES DAILY
Qty: 20 CAPSULE | Refills: 0 | Status: SHIPPED | OUTPATIENT
Start: 2022-05-27 | End: 2022-06-06

## 2022-05-27 RX ORDER — POLYETHYLENE GLYCOL 3350 17 G/17G
17 POWDER, FOR SOLUTION ORAL DAILY PRN
Status: DISCONTINUED | OUTPATIENT
Start: 2022-05-27 | End: 2022-05-28 | Stop reason: HOSPADM

## 2022-05-27 RX ORDER — DEXAMETHASONE SODIUM PHOSPHATE 4 MG/ML
INJECTION, SOLUTION INTRA-ARTICULAR; INTRALESIONAL; INTRAMUSCULAR; INTRAVENOUS; SOFT TISSUE PRN
Status: DISCONTINUED | OUTPATIENT
Start: 2022-05-27 | End: 2022-05-27 | Stop reason: SDUPTHER

## 2022-05-27 RX ORDER — MORPHINE SULFATE 2 MG/ML
1 INJECTION, SOLUTION INTRAMUSCULAR; INTRAVENOUS EVERY 4 HOURS PRN
Status: DISCONTINUED | OUTPATIENT
Start: 2022-05-27 | End: 2022-05-27

## 2022-05-27 RX ORDER — SODIUM CHLORIDE 0.9 % (FLUSH) 0.9 %
5-40 SYRINGE (ML) INJECTION EVERY 12 HOURS SCHEDULED
Status: DISCONTINUED | OUTPATIENT
Start: 2022-05-27 | End: 2022-05-28 | Stop reason: HOSPADM

## 2022-05-27 RX ORDER — FENTANYL CITRATE 50 UG/ML
INJECTION, SOLUTION INTRAMUSCULAR; INTRAVENOUS PRN
Status: DISCONTINUED | OUTPATIENT
Start: 2022-05-27 | End: 2022-05-27 | Stop reason: SDUPTHER

## 2022-05-27 RX ORDER — APREPITANT 40 MG/1
40 CAPSULE ORAL
Status: COMPLETED | OUTPATIENT
Start: 2022-05-27 | End: 2022-05-27

## 2022-05-27 RX ORDER — ACETAMINOPHEN 325 MG/1
650 TABLET ORAL EVERY 6 HOURS
Status: DISCONTINUED | OUTPATIENT
Start: 2022-05-27 | End: 2022-05-27

## 2022-05-27 RX ORDER — TIZANIDINE 4 MG/1
4 TABLET ORAL EVERY 8 HOURS PRN
Qty: 60 TABLET | Refills: 0 | Status: SHIPPED | OUTPATIENT
Start: 2022-05-27 | End: 2022-06-16

## 2022-05-27 RX ORDER — ASPIRIN 325 MG
325 TABLET, DELAYED RELEASE (ENTERIC COATED) ORAL 2 TIMES DAILY
Qty: 60 TABLET | Refills: 1 | Status: SHIPPED | OUTPATIENT
Start: 2022-05-27 | End: 2022-07-26

## 2022-05-27 RX ORDER — OXYCODONE HCL 10 MG/1
10 TABLET, FILM COATED, EXTENDED RELEASE ORAL
Status: COMPLETED | OUTPATIENT
Start: 2022-05-27 | End: 2022-05-27

## 2022-05-27 RX ORDER — MORPHINE SULFATE 4 MG/ML
INJECTION, SOLUTION INTRAMUSCULAR; INTRAVENOUS
Status: COMPLETED
Start: 2022-05-27 | End: 2022-05-27

## 2022-05-27 RX ORDER — EPHEDRINE SULFATE 50 MG/ML
INJECTION INTRAVENOUS PRN
Status: DISCONTINUED | OUTPATIENT
Start: 2022-05-27 | End: 2022-05-27 | Stop reason: SDUPTHER

## 2022-05-27 RX ORDER — SODIUM CHLORIDE 0.9 % (FLUSH) 0.9 %
5-40 SYRINGE (ML) INJECTION PRN
Status: DISCONTINUED | OUTPATIENT
Start: 2022-05-27 | End: 2022-05-27 | Stop reason: HOSPADM

## 2022-05-27 RX ORDER — OXYCODONE AND ACETAMINOPHEN 10; 325 MG/1; MG/1
1 TABLET ORAL EVERY 4 HOURS PRN
Qty: 40 TABLET | Refills: 0 | Status: SHIPPED | OUTPATIENT
Start: 2022-05-27 | End: 2022-06-03

## 2022-05-27 RX ORDER — FAMOTIDINE 10 MG/ML
20 INJECTION, SOLUTION INTRAVENOUS ONCE
Status: COMPLETED | OUTPATIENT
Start: 2022-05-27 | End: 2022-05-27

## 2022-05-27 RX ORDER — OXYCODONE AND ACETAMINOPHEN 10; 325 MG/1; MG/1
1 TABLET ORAL EVERY 4 HOURS PRN
Status: DISCONTINUED | OUTPATIENT
Start: 2022-05-27 | End: 2022-05-28 | Stop reason: HOSPADM

## 2022-05-27 RX ORDER — DEXAMETHASONE SODIUM PHOSPHATE 4 MG/ML
10 INJECTION, SOLUTION INTRA-ARTICULAR; INTRALESIONAL; INTRAMUSCULAR; INTRAVENOUS; SOFT TISSUE
Status: DISCONTINUED | OUTPATIENT
Start: 2022-05-27 | End: 2022-05-27 | Stop reason: HOSPADM

## 2022-05-27 RX ORDER — SODIUM CHLORIDE 450 MG/100ML
INJECTION, SOLUTION INTRAVENOUS CONTINUOUS
Status: DISCONTINUED | OUTPATIENT
Start: 2022-05-27 | End: 2022-05-28 | Stop reason: HOSPADM

## 2022-05-27 RX ORDER — ROCURONIUM BROMIDE 10 MG/ML
INJECTION, SOLUTION INTRAVENOUS PRN
Status: DISCONTINUED | OUTPATIENT
Start: 2022-05-27 | End: 2022-05-27 | Stop reason: SDUPTHER

## 2022-05-27 RX ORDER — LIDOCAINE HYDROCHLORIDE 10 MG/ML
INJECTION, SOLUTION EPIDURAL; INFILTRATION; INTRACAUDAL; PERINEURAL PRN
Status: DISCONTINUED | OUTPATIENT
Start: 2022-05-27 | End: 2022-05-27 | Stop reason: SDUPTHER

## 2022-05-27 RX ORDER — PREGABALIN 75 MG/1
150 CAPSULE ORAL ONCE
Status: COMPLETED | OUTPATIENT
Start: 2022-05-27 | End: 2022-05-27

## 2022-05-27 RX ORDER — OXYCODONE HYDROCHLORIDE 5 MG/1
5 TABLET ORAL EVERY 4 HOURS PRN
Status: DISCONTINUED | OUTPATIENT
Start: 2022-05-27 | End: 2022-05-27 | Stop reason: SDUPTHER

## 2022-05-27 RX ORDER — ACETAMINOPHEN 500 MG
1000 TABLET ORAL ONCE
Status: COMPLETED | OUTPATIENT
Start: 2022-05-27 | End: 2022-05-27

## 2022-05-27 RX ORDER — ACETAMINOPHEN 500 MG
1000 TABLET ORAL
Status: DISCONTINUED | OUTPATIENT
Start: 2022-05-27 | End: 2022-05-27 | Stop reason: SDUPTHER

## 2022-05-27 RX ORDER — PROPOFOL 10 MG/ML
INJECTION, EMULSION INTRAVENOUS PRN
Status: DISCONTINUED | OUTPATIENT
Start: 2022-05-27 | End: 2022-05-27 | Stop reason: SDUPTHER

## 2022-05-27 RX ORDER — PREDNISONE 10 MG/1
10 TABLET ORAL DAILY
Qty: 10 TABLET | Refills: 0 | Status: SHIPPED | OUTPATIENT
Start: 2022-05-27 | End: 2022-06-06

## 2022-05-27 RX ORDER — CELECOXIB 100 MG/1
400 CAPSULE ORAL ONCE
Status: COMPLETED | OUTPATIENT
Start: 2022-05-27 | End: 2022-05-27

## 2022-05-27 RX ORDER — ONDANSETRON 2 MG/ML
INJECTION INTRAMUSCULAR; INTRAVENOUS PRN
Status: DISCONTINUED | OUTPATIENT
Start: 2022-05-27 | End: 2022-05-27 | Stop reason: SDUPTHER

## 2022-05-27 RX ORDER — DIPHENHYDRAMINE HYDROCHLORIDE 50 MG/ML
12.5 INJECTION INTRAMUSCULAR; INTRAVENOUS
Status: DISCONTINUED | OUTPATIENT
Start: 2022-05-27 | End: 2022-05-27 | Stop reason: HOSPADM

## 2022-05-27 RX ORDER — MORPHINE SULFATE 2 MG/ML
2 INJECTION, SOLUTION INTRAMUSCULAR; INTRAVENOUS
Status: DISCONTINUED | OUTPATIENT
Start: 2022-05-27 | End: 2022-05-28 | Stop reason: HOSPADM

## 2022-05-27 RX ADMIN — FENTANYL CITRATE 50 MCG: 50 INJECTION INTRAMUSCULAR; INTRAVENOUS at 07:48

## 2022-05-27 RX ADMIN — PREGABALIN 150 MG: 75 CAPSULE ORAL at 06:59

## 2022-05-27 RX ADMIN — ROCURONIUM BROMIDE 10 MG: 10 SOLUTION INTRAVENOUS at 09:05

## 2022-05-27 RX ADMIN — DEXAMETHASONE SODIUM PHOSPHATE 10 MG: 4 INJECTION, SOLUTION INTRAMUSCULAR; INTRAVENOUS at 07:55

## 2022-05-27 RX ADMIN — OXYCODONE HYDROCHLORIDE 10 MG: 5 TABLET ORAL at 18:21

## 2022-05-27 RX ADMIN — ONDANSETRON 4 MG: 2 INJECTION INTRAMUSCULAR; INTRAVENOUS at 07:55

## 2022-05-27 RX ADMIN — OXYCODONE HYDROCHLORIDE 10 MG: 10 TABLET, FILM COATED, EXTENDED RELEASE ORAL at 06:58

## 2022-05-27 RX ADMIN — Medication 100 MCG: at 08:46

## 2022-05-27 RX ADMIN — PROPOFOL 25 MG: 10 INJECTION, EMULSION INTRAVENOUS at 11:45

## 2022-05-27 RX ADMIN — MORPHINE SULFATE 2 MG: 2 INJECTION, SOLUTION INTRAMUSCULAR; INTRAVENOUS at 20:12

## 2022-05-27 RX ADMIN — SODIUM CHLORIDE, POTASSIUM CHLORIDE, SODIUM LACTATE AND CALCIUM CHLORIDE: 600; 310; 30; 20 INJECTION, SOLUTION INTRAVENOUS at 12:03

## 2022-05-27 RX ADMIN — PROPOFOL 25 MG: 10 INJECTION, EMULSION INTRAVENOUS at 11:38

## 2022-05-27 RX ADMIN — PROPOFOL 25 MG: 10 INJECTION, EMULSION INTRAVENOUS at 11:33

## 2022-05-27 RX ADMIN — SODIUM CHLORIDE: 4.5 INJECTION, SOLUTION INTRAVENOUS at 16:16

## 2022-05-27 RX ADMIN — Medication 100 MCG: at 09:57

## 2022-05-27 RX ADMIN — EPHEDRINE SULFATE 5 MG: 50 INJECTION INTRAVENOUS at 08:53

## 2022-05-27 RX ADMIN — ROCURONIUM BROMIDE 10 MG: 10 SOLUTION INTRAVENOUS at 08:29

## 2022-05-27 RX ADMIN — Medication 100 MCG: at 08:02

## 2022-05-27 RX ADMIN — SUGAMMADEX 200 MG: 100 INJECTION, SOLUTION INTRAVENOUS at 11:55

## 2022-05-27 RX ADMIN — Medication 100 MCG: at 10:43

## 2022-05-27 RX ADMIN — ASPIRIN 325 MG: 325 TABLET, COATED ORAL at 20:09

## 2022-05-27 RX ADMIN — EPHEDRINE SULFATE 5 MG: 50 INJECTION INTRAVENOUS at 09:16

## 2022-05-27 RX ADMIN — HYDROMORPHONE HYDROCHLORIDE 0.2 MG: 2 INJECTION INTRAMUSCULAR; INTRAVENOUS; SUBCUTANEOUS at 11:46

## 2022-05-27 RX ADMIN — VANCOMYCIN HYDROCHLORIDE 1250 MG: 10 INJECTION, POWDER, LYOPHILIZED, FOR SOLUTION INTRAVENOUS at 07:43

## 2022-05-27 RX ADMIN — CELECOXIB 400 MG: 100 CAPSULE ORAL at 06:59

## 2022-05-27 RX ADMIN — ROCURONIUM BROMIDE 10 MG: 10 SOLUTION INTRAVENOUS at 09:56

## 2022-05-27 RX ADMIN — ACETAMINOPHEN 1000 MG: 500 TABLET ORAL at 06:58

## 2022-05-27 RX ADMIN — Medication 10 ML: at 16:19

## 2022-05-27 RX ADMIN — VANCOMYCIN HYDROCHLORIDE 1500 MG: 10 INJECTION, POWDER, LYOPHILIZED, FOR SOLUTION INTRAVENOUS at 20:09

## 2022-05-27 RX ADMIN — ROCURONIUM BROMIDE 50 MG: 10 SOLUTION INTRAVENOUS at 07:49

## 2022-05-27 RX ADMIN — SODIUM CHLORIDE, POTASSIUM CHLORIDE, SODIUM LACTATE AND CALCIUM CHLORIDE: 600; 310; 30; 20 INJECTION, SOLUTION INTRAVENOUS at 10:08

## 2022-05-27 RX ADMIN — TRANEXAMIC ACID 1293 MG: 100 INJECTION, SOLUTION INTRAVENOUS at 07:56

## 2022-05-27 RX ADMIN — FENTANYL CITRATE 25 MCG: 50 INJECTION INTRAMUSCULAR; INTRAVENOUS at 11:25

## 2022-05-27 RX ADMIN — Medication 100 MCG: at 10:15

## 2022-05-27 RX ADMIN — ACETAMINOPHEN 650 MG: 325 TABLET ORAL at 16:16

## 2022-05-27 RX ADMIN — SODIUM CHLORIDE, POTASSIUM CHLORIDE, SODIUM LACTATE AND CALCIUM CHLORIDE: 600; 310; 30; 20 INJECTION, SOLUTION INTRAVENOUS at 07:44

## 2022-05-27 RX ADMIN — Medication 100 MCG: at 08:39

## 2022-05-27 RX ADMIN — PROPOFOL 25 MG: 10 INJECTION, EMULSION INTRAVENOUS at 11:51

## 2022-05-27 RX ADMIN — Medication 100 MCG: at 08:16

## 2022-05-27 RX ADMIN — PROPOFOL 200 MG: 10 INJECTION, EMULSION INTRAVENOUS at 07:49

## 2022-05-27 RX ADMIN — HYDROMORPHONE HYDROCHLORIDE 0.2 MG: 2 INJECTION INTRAMUSCULAR; INTRAVENOUS; SUBCUTANEOUS at 11:31

## 2022-05-27 RX ADMIN — FAMOTIDINE 20 MG: 10 INJECTION, SOLUTION INTRAVENOUS at 07:00

## 2022-05-27 RX ADMIN — FENTANYL CITRATE 25 MCG: 50 INJECTION INTRAMUSCULAR; INTRAVENOUS at 09:24

## 2022-05-27 RX ADMIN — LIDOCAINE HYDROCHLORIDE 50 MG: 10 INJECTION, SOLUTION EPIDURAL; INFILTRATION; INTRACAUDAL; PERINEURAL at 07:48

## 2022-05-27 RX ADMIN — OXYCODONE AND ACETAMINOPHEN 1 TABLET: 10; 325 TABLET ORAL at 23:32

## 2022-05-27 RX ADMIN — ROCURONIUM BROMIDE 10 MG: 10 SOLUTION INTRAVENOUS at 10:43

## 2022-05-27 RX ADMIN — SODIUM CHLORIDE, POTASSIUM CHLORIDE, SODIUM LACTATE AND CALCIUM CHLORIDE: 600; 310; 30; 20 INJECTION, SOLUTION INTRAVENOUS at 06:59

## 2022-05-27 RX ADMIN — ASPIRIN 325 MG: 325 TABLET, COATED ORAL at 16:16

## 2022-05-27 RX ADMIN — APREPITANT 40 MG: 40 CAPSULE ORAL at 06:59

## 2022-05-27 RX ADMIN — MORPHINE SULFATE 4 MG: 4 INJECTION INTRAVENOUS at 12:28

## 2022-05-27 ASSESSMENT — PAIN DESCRIPTION - PAIN TYPE
TYPE: SURGICAL PAIN
TYPE: SURGICAL PAIN
TYPE: CHRONIC PAIN

## 2022-05-27 ASSESSMENT — PAIN SCALES - GENERAL
PAINLEVEL_OUTOF10: 7
PAINLEVEL_OUTOF10: 10
PAINLEVEL_OUTOF10: 7

## 2022-05-27 ASSESSMENT — PAIN DESCRIPTION - DESCRIPTORS
DESCRIPTORS: ACHING

## 2022-05-27 ASSESSMENT — PAIN DESCRIPTION - FREQUENCY
FREQUENCY: CONTINUOUS
FREQUENCY: CONTINUOUS

## 2022-05-27 ASSESSMENT — PAIN DESCRIPTION - LOCATION
LOCATION: GENERALIZED
LOCATION: HIP
LOCATION: HIP

## 2022-05-27 ASSESSMENT — PAIN DESCRIPTION - ORIENTATION
ORIENTATION: RIGHT
ORIENTATION: RIGHT
ORIENTATION: RIGHT;LEFT

## 2022-05-27 ASSESSMENT — PAIN DESCRIPTION - ONSET
ONSET: ON-GOING
ONSET: ON-GOING

## 2022-05-27 ASSESSMENT — LIFESTYLE VARIABLES: SMOKING_STATUS: 1

## 2022-05-27 NOTE — PROGRESS NOTES
SENT-Hostpitalist consult for medical management post op R hip revision. Continious pulse ox place related to oxygen dropping while falling asleep. On room air at this belem      Reply-Patient requesting changing from scheduled tylenol and oxycodone PRN to percocet. Also wanting PRN morphine has previously used in pass with prior hip/shoulder procedures. Sent-Those questions should be directed to ortho, please.

## 2022-05-27 NOTE — ANESTHESIA POSTPROCEDURE EVALUATION
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 Kristofer Phase I: Kristofer Score: 5    Kristofer Phase II:      Last vitals: Reviewed and per EMR flowsheets. Anesthesia Post Evaluation    Patient location during evaluation: PACU  Patient participation: complete - patient participated  Level of consciousness: awake and alert  Airway patency: patent  Nausea & Vomiting: no nausea and no vomiting  Complications: no  Cardiovascular status: blood pressure returned to baseline  Respiratory status: acceptable  Hydration status: euvolemic  Comments: VSS on transfer to phase 2 recovery. No anesthetic complications.

## 2022-05-27 NOTE — PROGRESS NOTES
Physical Therapy  Facility/Department: [place] [address] - MED SURG/ORTHO  Physical Therapy Initial Assessment and Treatment    Name: Tommy Parekh  : 1960  MRN: [id number]  Date of Service: 2022    Discharge Recommendations:  24 hour supervision or assist,Home with Home health PT   PT Equipment Recommendations  Equipment Needed: Yes  Mobility Devices: Leata Creeks: Rolling    If pt is unable to be seen after this session, please let this note serve as discharge summary. Please see case management note for discharge disposition. Thank you. Patient Diagnosis(es): The primary encounter diagnosis was Status post revision of total hip replacement. Diagnoses of Other mechanical complication of internal right hip prosthesis, [facility] Primary osteoarthritis of right knee, and Primary osteoarthritis of both hips were also pertinent to this visit. Past Medical History:  has a past medical history of Carpal tunnel syndrome, Foot pain, Neuropathy, and Osteoarthritis. Past Surgical History:  has a past surgical history that includes joint replacement (Bilateral); joint replacement (Bilateral); hip surgery (Bilateral, ); shoulder surgery (Bilateral, ); and Tonsillectomy. Assessment   Body Structures, Functions, Activity Limitations Requiring Skilled Therapeutic Intervention: Decreased functional mobility ; Decreased strength;Decreased posture; Increased pain;Decreased endurance  Assessment: Patient is a 65year old male who presented to [facility] on 3/71/12 with complication of right total hip. Patient received right total hip revision on 22. Per chart patient is WBAT RLE and has posterior hip precautions. Patient is normally able to independently ambulate community distances without an assistive device. Today he ambulated up to 30 feet with a rolling walker and CGA. Patient is below his prior level of function and would benefit from skilled PT plan of care.  Patient is safe for home, when medically stable, with 24/7 supervision/assistance, home PT and a rolling walker. Argelia Campbell from [facility] DME informed of patient's RW need. PT to continue to follow. Treatment Diagnosis: decreased independence with functional mobility. Specific Instructions for Next Treatment: progress mobility as tolerated  Therapy Prognosis: Excellent  Decision Making: Low Complexity  Barriers to Learning: none  Requires PT Follow-Up: Yes  Activity Tolerance  Activity Tolerance: Patient tolerated treatment well  Activity Tolerance Comments: Vitals: 100% on 2L. 71 BPM. 126/82. post activity (with patient in chair): 99% on room air. 80 BPM. 124/73. Plan   Plan  Plan: 2 times a day 7 days a week  Plan weeks: 2 days 5/29/22  Specific Instructions for Next Treatment: progress mobility as tolerated  Current Treatment Recommendations: Strengthening,Functional mobility training,Transfer training,Endurance training,Gait training,Stair training,Home exercise program,Safety education & training,Patient/Caregiver education & training,Equipment evaluation, education, & procurement,Therapeutic activities  Safety Devices  Type of Devices: All donal prominences offloaded,All fall risk precautions in place,Left in chair,Call light within reach,Gait belt,Nurse notified,Patient at risk for falls     Restrictions  Restrictions/Precautions  Restrictions/Precautions: ROM Restrictions,Surgical Protocols,Fall Risk,General Precautions,Weight Bearing  Lower Extremity Weight Bearing Restrictions  Right Lower Extremity Weight Bearing: Weight Bearing As Tolerated  Position Activity Restriction  Hip Precautions: No hip flexion > 90 degrees,No ADduction,No hip external rotation  Other position/activity restrictions: 1)  Dangle at edge of bed, progress to stand, and take a few steps with assistive device. 2)  Encourage ankle pumps and quad sets. 3)  Up in bedside chair as tolerated. 4)  Commode privileges with assistance.  Full weight bearing on operative leg limits    Cognition   Orientation  Overall Orientation Status: Within Normal Limits  Cognition  Overall Cognitive Status: WNL     Objective   Heart Rate: 77  Heart Rate Source: Monitor  BP: 124/79  BP Location: Left upper arm  BP Method: Automatic  Patient Position: Semi fowlers  MAP (Calculated): 94  Resp: 21  SpO2: 100 %  O2 Device: None (Room air)     Observation/Palpation  Posture: Fair  Gross Assessment  Sensation: Impaired (numbness in bilateral upper and lower extremities)                    Bed mobility  Supine to Sit: Contact guard assistance  Sit to Supine: Unable to assess (patient in chair at end of therapy session)  Scooting: Contact guard assistance (to edge of bed)  Bed Mobility Comments: head of bed elevated  Transfers  Sit to Stand: Contact guard assistance  Stand to sit: Contact guard assistance  Bed to Chair: Contact guard assistance  Comment: cueing for safe hand placement. Ambulation  Surface: level tile  Device: Rolling Walker  Assistance: Contact guard assistance  Quality of Gait: decreased gait velocity, step length. antalgiv non-reciprocal step pattern. cueing to maintain base of support within rolling walker. Gait Deviations: Slow Maria Elena;Decreased step length;Decreased step height  Distance: 30 feet. 15 feet  Comments: No complaints of SOB, chest pain or dizziness. No loss of balance. More Ambulation?: No     Balance  Posture: Fair  Sitting - Static: Good  Sitting - Dynamic: Good  Standing - Static: Fair  Standing - Dynamic: Fair  Comments: with rolling walker. Exercise Treatment: 1 x 10 bilateral AROM quad sets, ankle pumps, glut sets. 1 x 10 AROM LLE long arc quads.   A/AROM Exercises: 1 x 10 AAROM long arc quads for RLE        AM-PAC Score     AM-PAC Inpatient Mobility without Stair Climbing Raw Score : 15 (05/27/22 1740)  AM-PAC Inpatient without Stair Climbing T-Scale Score : 43.03 (05/27/22 1740)  Mobility Inpatient CMS 0-100% Score: 47.43 (05/27/22 1740)  Mobility Inpatient without Stair CMS G-Code Modifier : CK (05/27/22 9660)       Goals  Short Term Goals  Time Frame for Short term goals: 2-3 sessions 5/29/22  Short term goal 1: Supine <> sit with modified independence. Short term goal 2: Sit <> stand and bed <> chair with rolling walker and supervision. Short term goal 3: Ambulate 150 feet with rolling walker and supervision. Short term goal 4: Ascend 3 steps with rail and SBA  Short term goal 5: By 5/28/22 patient will tolerate 12-15 reps of his exercises to maximize his endurance and strength. Patient Goals   Patient goals : \"To ascend 2 steps with rail and a little bit of help (minimum assistance). \" Goal to be met in 1-2 sessions by 5/28/22. Education  Patient Education  Education Given To: Patient  Education Provided: Role of Therapy;Plan of Care;Precautions;Transfer Training; Fall Prevention Strategies; Equipment;Home Exercise Program;Family Education  Education Provided Comments: Disease specific education: patient educated on the benefits of increased mobility, safety with rolling walker, weight bearing status WBAT RLE, posterior hip precautions.   Education Method: Demonstration;Verbal  Barriers to Learning: None  Education Outcome: Verbalized understanding;Demonstrated understanding      Therapy Time   Individual Concurrent Group Co-treatment   Time In 2057         Time Out 1721         Minutes 49         Timed Code Treatment Minutes: 39 Minutes (10 minute evaluation)       Patrick Mcginnis, PT

## 2022-05-27 NOTE — PROGRESS NOTES
Occupational Therapy  OT order received. PACU recommended for pt to be seen once admitted to C5 d/t O2 sats and pain. Cont OT.   Patrick Bedoya OT

## 2022-05-27 NOTE — PROGRESS NOTES
Arrived to [facility] consent verified, NPO since 2300, belongings in black bag (wallet/cellphone with Fabienne Poet).

## 2022-05-27 NOTE — OP NOTE
6.5X25 SELF-TAP - [id number]  BONE SCREW 6.5X25 SELF-TAP  [employer]- [id number] Right 1 Implanted   BONE SCREW 6.5X30 SELF-TAP - [id number]  BONE SCREW 6.5X30 SELF-TAP  [employer]-WD [id number] Right 1 Implanted   HEAD FEM [id number] HIP BIOLOX DELT OPT TYP 1 TAPR G7 - [id number]  HEAD FEM [id number] HIP BIOLOX DELT OPT TYP 1 TAPR G7  [employer]- [id number] Right 1 Implanted   SLEEVE FEM +3MM OFFSET HIP TYP 1 TAPR OPT HD ACT ARTC 2 - [id number]  SLEEVE FEM +3MM OFFSET HIP TYP 1 TAPR OPT HD ACT ARTC 2  [employer]- [id number] Right 1 Implanted   DEVICE REATTACHMENT L50MM DIA1MM STD TROCHANTERIC TI W/ CO - [id number]  DEVICE REATTACHMENT L50MM DIA1MM STD TROCHANTERIC TI W/ CO  [employer]- [id number] Right 1 Implanted   GRAFT BNE CRUSH 15 CC CANC - [id number]  GRAFT BNE CRUSH 15 CC CANC [id number] [facility] TISSUE AND SKIN [place]-  Right 1 Implanted         Drains:   Negative Pressure Wound Therapy Leg Anterior;Left;Upper (Active)       Findings:      Detailed Description of Procedure:     OPERATIVE REPORT      Patient Name:   Dionte Hernandez Surgeon:   Karina Boyce MD  :   1960 Dictated by:   Karina Boyce MD  MRN #:   [id number] PCP:  Krishan Byrnes PA-C   Date of Surgery:  2022 Referring:   Iva Young DO  SURGEON:   TRISH Hopson: Inez Rivero [facility]  ANESTHESIA:  General.       PREOPERATIVE DIAGNOSES:      1. Right knee osteoarthritis. 2. Chronic Right knee pain        POSTOPERATIVE DIAGNOSES:      same          PROCEDURES PERFORMED:               1.Needle localization Right under flouro  2. Injection of large joint Right under flouro  3.  Geniculate nerve block of superolateral  superomedial  Inferomedial geniculate nerve block Right         SURGEON:   TRISH Hopson: Inez Rivero [facility]       DETAILS OF PROCEDURE:        The patient was given preop medication and brought to the operating room where the surgery was again confirmed, as scheduled   A time-out confirmation was performed, according to the universal protocol. Blood was obtained and then centrifuged. This was fractionated into platelet rich/poor plasma and platelet poor plasma aggregate. Using flouro 22 gauge needles were placed in the areas of the Right femur and tibia for superolateral, superomedial, inferomedial injection after local injection of 3cc into each site. 7cc of exparel and 6cc of ppp were injected into each portal of the geniculate needles. Flouro localization was done for Right knee after injection with local anaestheitic. 1% xylocaine into the injection site. Injection was done through needle localization with contrast and flouro of PRP, PPP was done into the joint through a lateral approach and locally at the knee joint lateral joint line    Compression dressing and sterile gauze was applied. Complications:  None; patient tolerated the procedure well total hip revision was then started.  Please see dictation for completion of the procedure.              ____________________________________           Jessica Henderson,          ____________________________________     Electronically signed by Jessica Henderson MD on 5/27/2022 at 12:14 PM

## 2022-05-27 NOTE — H&P
for abdominal pain. Musculoskeletal: Positive for joint pain and myalgias. Skin: Negative for rash. Neurological: Negative for seizures. Psychiatric/Behavioral: Negative for hallucinations. Objective:     Patient Vitals for the past 8 hrs:   BP Temp Temp src Pulse Resp SpO2   05/27/22 0615 119/82 97.7 °F (36.5 °C) Tympanic 74 16 99 %     /82   Pulse 74   Temp 97.7 °F (36.5 °C) (Tympanic)   Resp 16   Wt 190 lb (86.2 kg)   SpO2 99%   BMI 25.07 kg/m²     General Appearance:    Alert, cooperative, no distress, appears stated age     Head:    Normocephalic, without obvious abnormality, atraumatic   Eyes:    PERRL, conjunctiva/corneas clear      Ears:    Normal  both ears   Nose:   Nares normal,   mucosa normal, no drainage     Throat:   Lips, mucosa, and tongue normal;   Neck:   Supple, symmetrical, trachea midline,          Lungs:      respirations unlabored   Chest Wall:    No tenderness or deformity    Heart:    Regular rate          Abdomen:     Soft, non-tender,     no masses              Extremities:   Extremities  no cyanosis or edema     Pulses:   2+ and symmetric all extremities     Skin:   Skin color, texture, turgor normal         Neurologic:   CNII-XII intact, normal strength, sensation            Assessment:     Active Problems:    * No active hospital problems. *  Resolved Problems:    * No resolved hospital problems. *      Plan:     The various methods of treatment have been discussed with the patient and family. After consideration of risks, benefits and other options for treatment, the patient has consented to surgical interventions (right hip revision, right knee osteoarthritis injection).

## 2022-05-27 NOTE — ANESTHESIA PRE PROCEDURE
Department of Anesthesiology  Preprocedure Note       Name:  Darin Yeager   Age:  65 y.o.  :  1960                                          MRN:  [id number]         Date:  2022      Surgeon: Sandhya Coronado):  Quynh Mirza MD    Procedure: Procedure(s):  REVISION RIGHT TOTAL HIP ARTHROPLASTY          ROBISON & NEPHEW    [place]  RIGHT KNEE GENICULAR NERVE BLOCK WITH INTRA ARTICULAR INJECTION UNDER FLUOROSCOPIC GUIDANCE    Medications prior to admission:   Prior to Admission medications    Medication Sig Start Date End Date Taking? Authorizing Provider   nystatin (MYCOSTATIN) 707893 UNIT/ML suspension Take 500,000 Units by mouth 4 times daily   Yes Historical Provider, MD   oxyCODONE-acetaminophen (PERCOCET)  MG per tablet Take 1 tablet by mouth every 8 hours as needed for Pain for up to 30 days.  Intended supply: 30 days 5/3/22 6/2/22  Sonali Escobar MD   ibuprofen (ADVIL;MOTRIN) 800 MG tablet Take 1 tablet by mouth 2 times daily 22   Sonali Escobar MD   tiZANidine (ZANAFLEX) 4 MG tablet Take 1 tablet by mouth 4 times daily as needed (muscle spasm) 22   Sonali Escobar MD       Current medications:    Current Facility-Administered Medications   Medication Dose Route Frequency Provider Last Rate Last Admin    famotidine (PEPCID) injection 20 mg  20 mg IntraVENous Once Amrit Herman MD        lactated ringers infusion   IntraVENous Continuous Amrit Herman MD        sodium chloride flush 0.9 % injection 5-40 mL  5-40 mL IntraVENous 2 times per day Amrit Herman MD        sodium chloride flush 0.9 % injection 5-40 mL  5-40 mL IntraVENous PRN Amrit Herman MD        0.9 % sodium chloride infusion   IntraVENous PRN Amrit Herman MD        acetaminophen (TYLENOL) tablet 1,000 mg  1,000 mg Oral Once Sonali Escobar MD        celecoxib (CELEBREX) capsule 400 mg  400 mg Oral Once Sonali Escobar MD        pregabalin (LYRICA) capsule 150 mg  150 mg Oral Once Simon Ferrera MD        Dexamethasone Sodium Phosphate injection 10 mg  10 mg IntraVENous On Call to OR Simon Ferrera MD        oxyCODONE (OXYCONTIN) extended release tablet 10 mg  10 mg Oral On Call to [address] [place] [address] Street, MD        ortho mix injection   Injection On Call Simon Ferrera MD        tranexamic acid (CYKLOKAPRON) injection 1,293 mg  15 mg/kg IntraVENous On Call to OR Simon Ferrera MD        aprepitant (EMEND) capsule 40 mg  40 mg Oral On Call to OR Simon Ferrera MD        vancomycin (VANCOCIN) 1,250 mg in dextrose 5 % 250 mL IVPB  1,250 mg IntraVENous On Call to OR Simon Ferrera MD           Allergies:  No Known Allergies    Problem List:    Patient Active Problem List   Diagnosis Code    OA (osteoarthritis) M19.90    Closed displaced comminuted fracture of shaft of right femur ([facility] [address] S72.351A    Right knee pain M25.561       Past Medical History:        Diagnosis Date    Carpal tunnel syndrome     bilateral    Foot pain     bilat - gotten worse    Neuropathy     Osteoarthritis        Past Surgical History:        Procedure Laterality Date    HIP SURGERY Bilateral 2002    JOINT REPLACEMENT Bilateral     bilat hip replacement    JOINT REPLACEMENT Bilateral     shoulder replacement    SHOULDER SURGERY Bilateral 2002    TONSILLECTOMY         Social History:    Social History     Tobacco Use    Smoking status: Current Some Day Smoker    Smokeless tobacco: Never Used    Tobacco comment: infrequently smokes a pipe or cigarette - hasn't had any since September 17   Substance Use Topics    Alcohol use:  Yes     Alcohol/week: 0.0 standard drinks     Comment: occassional                                Ready to quit: Not Answered  Counseling given: Not Answered  Comment: infrequently smokes a pipe or cigarette - hasn't had any since September 17      Vital Signs (Current):   Vitals:    05/23/22 1242 05/27/22 7555 BP:  119/82   Pulse:  74   Temp:  97.7 °F (36.5 °C)   TempSrc:  Tympanic   SpO2:  99%   Weight: 190 lb (86.2 kg)                                               BP Readings from Last 3 Encounters:   05/27/22 119/82   12/04/21 134/84   12/12/17 122/87       NPO Status:                                                                                 BMI:   Wt Readings from Last 3 Encounters:   05/23/22 190 lb (86.2 kg)   04/27/22 190 lb (86.2 kg)   03/09/22 190 lb (86.2 kg)     Body mass index is 25.07 kg/m². CBC:   Lab Results   Component Value Date    WBC 4.6 05/02/2022    RBC 4.57 05/02/2022    HGB 13.1 05/02/2022    HCT 39.1 05/02/2022    MCV 85.7 05/02/2022    RDW 15.2 05/02/2022     05/02/2022       CMP:   Lab Results   Component Value Date     05/02/2022    K 3.8 05/02/2022     05/02/2022    CO2 23 05/02/2022    BUN 16 05/02/2022    CREATININE 0.8 05/02/2022    GFRAA >60 05/02/2022    AGRATIO 1.4 05/02/2022    LABGLOM >60 05/02/2022    GLUCOSE 102 05/02/2022    PROT 8.0 05/02/2022    CALCIUM 9.8 05/02/2022    BILITOT 0.3 05/02/2022    ALKPHOS 45 05/02/2022    AST 37 05/02/2022    ALT 32 05/02/2022       POC Tests: No results for input(s): POCGLU, POCNA, POCK, POCCL, POCBUN, POCHEMO, POCHCT in the last 72 hours.     Coags: No results found for: PROTIME, INR, APTT    HCG (If Applicable): No results found for: PREGTESTUR, PREGSERUM, HCG, HCGQUANT     ABGs: No results found for: PHART, PO2ART, CGV6PLU, TIH2OUL, BEART, Q8XZKILZ     Type & Screen (If Applicable):  No results found for: LABABO, LABRH    Drug/Infectious Status (If Applicable):  No results found for: HIV, HEPCAB    COVID-19 Screening (If Applicable): No results found for: COVID19        Anesthesia Evaluation   no history of anesthetic complications:   Airway: Mallampati: II  TM distance: >3 FB   Neck ROM: full  Mouth opening: > = 3 FB   Dental:    (+) upper dentures      Pulmonary:   (+) current smoker Cardiovascular:Negative CV ROS                      Neuro/Psych:   (+) neuromuscular disease:,             GI/Hepatic/Renal: Neg GI/Hepatic/Renal ROS            Endo/Other:    (+) : arthritis: OA., .                 Abdominal:             Vascular: negative vascular ROS. Other Findings:           Anesthesia Plan      general     ASA 3     (Risks, benefits and alternatives of GETA for operative care and ultrasound guided fascia iliaca compartment nerve block for post operative analgesia discussed with pt. All questions answered.   Willing to proceed.)                              Jacki Ahmadi MD   5/27/2022

## 2022-05-27 NOTE — PROGRESS NOTES
Occupational Therapy  Facility/Department: [place] [address] - MED SURG/ORTHO  Occupational Therapy Initial Assessment    Name: Puma Fields  : 1960  MRN: [id number]  Date of Service: 2022    Discharge Recommendations:  24 hour supervision or assist          Patient Diagnosis(es): The primary encounter diagnosis was Status post revision of total hip replacement. Diagnoses of Other mechanical complication of internal right hip prosthesis, initial encounter ([facility] [address] Primary osteoarthritis of right knee, and Primary osteoarthritis of both hips were also pertinent to this visit. Past Medical History:  has a past medical history of Carpal tunnel syndrome, Foot pain, Neuropathy, and Osteoarthritis. Past Surgical History:  has a past surgical history that includes joint replacement (Bilateral); joint replacement (Bilateral); hip surgery (Bilateral, ); shoulder surgery (Bilateral, ); and Tonsillectomy. Treatment Diagnosis: impaired mobility      Assessment   Performance deficits / Impairments: Decreased functional mobility ; Decreased safe awareness;Decreased endurance;Decreased balance;Decreased ADL status  Assessment: Patient is a 65year old male who presented to [facility] on 04 with complication of right total hip. Patient received right total hip revision on 22. Per chart patient is WBAT RLE and has posterior hip precautions. PTA, pt was IND with functional mobility and I/ADLs. Currently pt is requiring SBA and use of RW and Max A for LB dressing. Pt would benefit from continued occupational therapy services during acute hospital stay to further address ADL deficits prior to discharge. Recommend pt to discharge home with 24H supv/assist provided by spouse once medically stable.   Treatment Diagnosis: impaired mobility  Prognosis: Good  Decision Making: Low Complexity  REQUIRES OT FOLLOW-UP: Yes  Activity Tolerance  Activity Tolerance: Patient limited by pain  Activity Tolerance Comments: Pt reporting 7-10/10 pain on operative leg        Plan   Plan  Times per Week: 4-6x  Current Treatment Recommendations: Strengthening,Balance training,Functional mobility training,Endurance training,Safety education & training,Patient/Caregiver education & training,Equipment evaluation, education, & procurement,Self-Care / ADL,Home management training,Positioning     Restrictions  Restrictions/Precautions  Restrictions/Precautions: ROM Restrictions,Surgical Protocols,Fall Risk,General Precautions,Weight Bearing  Lower Extremity Weight Bearing Restrictions  Right Lower Extremity Weight Bearing: Weight Bearing As Tolerated  Position Activity Restriction  Hip Precautions: No hip flexion > 90 degrees,No ADduction,No hip external rotation  Other position/activity restrictions: 1)  Dangle at edge of bed, progress to stand, and take a few steps with assistive device. 2)  Encourage ankle pumps and quad sets. 3)  Up in bedside chair as tolerated. 4)  Commode privileges with assistance. Full weight bearing on operative leg    Subjective   General  Chart Reviewed: Yes,Orders,Progress Notes,History and Physical,Imaging,Operative Notes,Labs  Patient assessed for rehabilitation services?: Yes  Response to previous treatment: Patient with no complaints from previous session  Family / Caregiver Present: Yes (spouse)  Referring Practitioner: REYNA Cross  Diagnosis: s/p revision of R total hip  Subjective  Subjective: Pt presented seated in chair and agreeable to therapy evaluation  General Comment  Comments: RN cleared pt for therapy  Pain: 7/10 right hip pain at rest. 10/10 with movement. Patient's RN made aware.   Social/Functional History  Social/Functional History  Lives With: Spouse (wife currently works)  Type of Home: House  Home Layout: Two level,Able to Live on Main level with bedroom/bathroom  Home Access: Stairs to enter with rails  Entrance Stairs - Number of Steps: 4  Entrance Stairs - Rails: Left  Bathroom Shower/Tub: Tub/Shower unit  Bathroom Toilet: Handicap height  Bathroom Equipment: Shower chair,Grab bars in shower  Home Equipment:  (wife reports access to RW at discharge)  Has the patient had two or more falls in the past year or any fall with injury in the past year?: Yes  ADL Assistance: Independent (patient said that he needs extra time to complete most activities.)  Homemaking Assistance: Independent  Homemaking Responsibilities: Yes  Meal Prep Responsibility: Secondary  Laundry Responsibility: Secondary  Cleaning Responsibility: Secondary  Ambulation Assistance: Independent (can ambulate community distances without an assistive device)  Transfer Assistance: Independent  Active : Yes  Occupation: Retired  Additional Comments: Patient fell in November 2021 and that resulted in right hip fracture. \"I had mild falls here and there around the house. \"; Pt reports he will perform sponge baths at discharge to adhere to hip precautions       Objective   Heart Rate: 79  Heart Rate Source: Monitor  BP: 108/65  BP Location: Left upper arm  BP Method: Automatic  Patient Position: Sitting;Up in chair  MAP (Calculated): 79.33  Resp: 21  SpO2: 98 %  O2 Device: None (Room air)          Observation/Palpation  Posture: Fair  Safety Devices  Type of Devices: All donal prominences offloaded; All fall risk precautions in place; Left in chair;Call light within reach;Gait belt;Nurse notified; Patient at risk for falls     Toilet Transfers  Toilet - Technique: Ambulating  Equipment Used: Standard toilet  Toilet Transfer: Stand by assistance  Toilet Transfers Comments: use of grab bar  AROM: Within functional limits  PROM: Within functional limits  Strength: Generally decreased, functional  Coordination: Generally decreased, functional  Tone: Normal  Sensation: Intact  ADL  Grooming: Stand by assistance  Grooming Skilled Clinical Factors: washing hands at sink with use of RW  Toileting: Stand by assistance  Toileting Skilled Clinical Factors: Pt completed 3/3 parts for toileting  Additional Comments: Pt declining further ADLs     Activity Tolerance  Activity Tolerance: Patient tolerated treatment well  Activity Tolerance Comments: Vitals: 100% on 2L. 71 BPM. 126/82. post activity (with patient in chair): 99% on room air. 80 BPM. 124/73. Transfers  Stand Pivot Transfers: Stand by assistance  Sit to stand: Stand by assistance  Stand to sit: Stand by assistance  Transfer Comments: use of RW     Cognition  Overall Cognitive Status: WNL  Perception  Overall Perceptual Status: [facility]               Education Given To: Patient; Family  Education Provided: Role of Therapy;Plan of Care;Transfer Training;Precautions;IADL Safety  Education Provided Comments: Pt educated on DME and use of hip precautions during ADLs. Disease Specific Education: Pt educated on weight bearing status, post-op precautions, appropriate DME, and safe mobility with AD. Pt verbalized understanding  Education Method: Verbal;Demonstration  Barriers to Learning: None  Education Outcome: Verbalized understanding  LUE AROM (degrees)  LUE AROM : WFL  Left Hand AROM (degrees)  Left Hand AROM: WFL  RUE AROM (degrees)  RUE AROM : WFL  Right Hand AROM (degrees)  Right Hand AROM: [facility]       AM-PAC Score  AM-[facility] Inpatient Daily Activity Raw Score: 18 (05/27/22 1814)  AM-PAC Inpatient ADL T-Scale Score : 38.66 (05/27/22 1814)  ADL Inpatient CMS 0-100% Score: 46.65 (05/27/22 1814)  ADL Inpatient CMS G-Code Modifier : CK (05/27/22 1814)    Goals  Short Term Goals  Time Frame for Short term goals: 1 week (6/03) unless noted  Short Term Goal 1: Pt will perform functional transfers with Mod I and use of LRAD  Short Term Goal 2: Pt will perform LB dressing with SBA and use of LRAD  Short Term Goal 3: Pt will perform bathroom mobility with mod I  Patient Goals   Patient goals :  To return home       Therapy Time   Individual Concurrent Group Co-treatment   Time In 1724         Time Out 1758 Minutes 34         Timed Code Treatment Minutes: 24 Minutes      If pt is unable to be seen after this session, please let this note serve as discharge summary. Please see case management note for discharge disposition. Thank you.       Fito Dorman OT

## 2022-05-27 NOTE — PROGRESS NOTES
ALPA    SENT-How long should patient keep purple abductor pillow in place. Patient complaining and wanting to remove pillow. Do not see order instructions regarding pillow    REPLY-Can just be pillows between or can take straps off and move down the bed      SENT-With the patient being admitted for oxygen sats dropping while falling asleep do you want to place patient on continuous pulse ox? REPLY-Yes can you get a medicine evaluation as well and keep the patient on a continuous pulse ox    Sent-Patient requesting changing from scheduled tylenol and oxycodone PRN to percocet. Also wanting PRN morphine has previously used in pass with prior hip/shoulder procedures    Reply-Thats fine to use Percocet 10 q 4 hours  Morphine 1-2 q 2 hours prn    Sent-great.  Thank you

## 2022-05-27 NOTE — PROGRESS NOTES
Pt falls asleep and desats to 88% on ra. When pt awakens he reports pain 10/10. Pt placed back on 1.5l nc and surgeon notified of [address] Verbal oral obtained for admission. Family notified of admission as well.

## 2022-05-27 NOTE — PROGRESS NOTES
Dr. Rice Held explanted right acetabular cup and right femoral head implants from previous right hip replacement.

## 2022-05-28 VITALS
WEIGHT: 190 LBS | RESPIRATION RATE: 18 BRPM | OXYGEN SATURATION: 99 % | TEMPERATURE: 98 F | SYSTOLIC BLOOD PRESSURE: 122 MMHG | HEART RATE: 93 BPM | BODY MASS INDEX: 25.07 KG/M2 | DIASTOLIC BLOOD PRESSURE: 70 MMHG

## 2022-05-28 LAB
ANION GAP SERPL CALCULATED.3IONS-SCNC: 7 MMOL/L (ref 3–16)
BUN BLDV-MCNC: 16 MG/DL (ref 7–20)
CALCIUM SERPL-MCNC: 8.9 MG/DL (ref 8.3–10.6)
CHLORIDE BLD-SCNC: 104 MMOL/L (ref 99–110)
CO2: 25 MMOL/L (ref 21–32)
CREAT SERPL-MCNC: 0.7 MG/DL (ref 0.8–1.3)
GFR AFRICAN AMERICAN: >60
GFR NON-AFRICAN AMERICAN: >60
GLUCOSE BLD-MCNC: 144 MG/DL (ref 70–99)
HCT VFR BLD CALC: 28.7 % (ref 40.5–52.5)
HEMOGLOBIN: 9.9 G/DL (ref 13.5–17.5)
MCH RBC QN AUTO: 29.5 PG (ref 26–34)
MCHC RBC AUTO-ENTMCNC: 34.6 G/DL (ref 31–36)
MCV RBC AUTO: 85.1 FL (ref 80–100)
PDW BLD-RTO: 15.4 % (ref 12.4–15.4)
PLATELET # BLD: 183 K/UL (ref 135–450)
PMV BLD AUTO: 7.5 FL (ref 5–10.5)
POTASSIUM SERPL-SCNC: 4.5 MMOL/L (ref 3.5–5.1)
RBC # BLD: 3.37 M/UL (ref 4.2–5.9)
SODIUM BLD-SCNC: 136 MMOL/L (ref 136–145)
WBC # BLD: 10 K/UL (ref 4–11)

## 2022-05-28 PROCEDURE — 6370000000 HC RX 637 (ALT 250 FOR IP): Performed by: ORTHOPAEDIC SURGERY

## 2022-05-28 PROCEDURE — 99024 POSTOP FOLLOW-UP VISIT: CPT

## 2022-05-28 PROCEDURE — G0378 HOSPITAL OBSERVATION PER HR: HCPCS

## 2022-05-28 PROCEDURE — APPNB45 APP NON BILLABLE 31-45 MINUTES

## 2022-05-28 PROCEDURE — 85027 COMPLETE CBC AUTOMATED: CPT

## 2022-05-28 PROCEDURE — 36415 COLL VENOUS BLD VENIPUNCTURE: CPT

## 2022-05-28 PROCEDURE — 2580000003 HC RX 258: Performed by: PHYSICIAN ASSISTANT

## 2022-05-28 PROCEDURE — 6370000000 HC RX 637 (ALT 250 FOR IP): Performed by: PHYSICIAN ASSISTANT

## 2022-05-28 PROCEDURE — 80048 BASIC METABOLIC PNL TOTAL CA: CPT

## 2022-05-28 RX ADMIN — ASPIRIN 325 MG: 325 TABLET, COATED ORAL at 07:51

## 2022-05-28 RX ADMIN — OXYCODONE AND ACETAMINOPHEN 1 TABLET: 10; 325 TABLET ORAL at 07:50

## 2022-05-28 RX ADMIN — OXYCODONE AND ACETAMINOPHEN 1 TABLET: 10; 325 TABLET ORAL at 03:38

## 2022-05-28 RX ADMIN — SODIUM CHLORIDE: 4.5 INJECTION, SOLUTION INTRAVENOUS at 07:44

## 2022-05-28 ASSESSMENT — PAIN DESCRIPTION - DESCRIPTORS: DESCRIPTORS: ACHING

## 2022-05-28 ASSESSMENT — PAIN DESCRIPTION - FREQUENCY: FREQUENCY: CONTINUOUS

## 2022-05-28 ASSESSMENT — PAIN DESCRIPTION - LOCATION: LOCATION: HIP

## 2022-05-28 ASSESSMENT — PAIN DESCRIPTION - PAIN TYPE: TYPE: SURGICAL PAIN

## 2022-05-28 ASSESSMENT — PAIN DESCRIPTION - ORIENTATION: ORIENTATION: RIGHT

## 2022-05-28 ASSESSMENT — PAIN SCALES - GENERAL
PAINLEVEL_OUTOF10: 0
PAINLEVEL_OUTOF10: 0
PAINLEVEL_OUTOF10: 10

## 2022-05-28 ASSESSMENT — PAIN DESCRIPTION - ONSET: ONSET: ON-GOING

## 2022-05-28 NOTE — CARE COORDINATION
CM noted discharge order and spoke with patient. Patient states he spoke with his doctor and they have decided he does not need home care and has DME. Wife to transport to home.

## 2022-05-28 NOTE — PROGRESS NOTES
Department of Orthopedic Surgery  Physician Assistant   Progress Note    Subjective:     Post-Operative Day: 1 Status Post right Total Hip Arthroplasty- Revision with Dr. Verito Garsia. Systemic or Specific Complaints: No Complaints. Pain well controlled. He did have some post op hypoxia he believes was due to medications. Denies SOB today. Denies nausea and dizziness.  Has not worked with PT/OT    Objective:     Patient Vitals for the past 24 hrs:   BP Temp Temp src Pulse Resp SpO2   05/28/22 0745 122/70 98 °F (36.7 °C) Oral 93 18 99 %   05/28/22 0339 113/72 98.2 °F (36.8 °C) Oral 90 19 98 %   05/27/22 2307 112/67 97.7 °F (36.5 °C) Oral 92 20 97 %   05/27/22 1930 112/69 97.5 °F (36.4 °C) Oral 91 19 93 %   05/27/22 1753 108/65 -- -- 79 -- 98 %   05/27/22 1548 124/79 97.9 °F (36.6 °C) Oral 77 21 100 %   05/27/22 1545 124/79 -- -- 72 -- 100 %   05/27/22 1505 -- -- -- 83 21 98 %   05/27/22 1502 132/82 -- -- 73 12 96 %   05/27/22 1457 -- -- -- 70 (!) 7 97 %   05/27/22 1424 -- -- -- 70 (!) 8 97 %   05/27/22 1420 -- -- -- 70 (!) 9 96 %   05/27/22 1415 -- -- -- 67 11 100 %   05/27/22 1412 -- -- -- 71 (!) 8 95 %   05/27/22 1410 -- -- -- 73 (!) 8 96 %   05/27/22 1402 137/79 -- -- 71 11 100 %   05/27/22 1329 -- -- -- 74 (!) 8 (!) 87 %   05/27/22 1328 -- -- -- 73 (!) 9 (!) 88 %   05/27/22 1317 120/78 -- -- 72 (!) 8 92 %   05/27/22 1315 -- -- -- 75 (!) 7 98 %   05/27/22 1311 -- -- -- 82 12 97 %   05/27/22 1258 -- -- -- 78 (!) 9 99 %   05/27/22 1256 124/82 -- -- 73 (!) 7 100 %   05/27/22 1255 -- -- -- 82 (!) 8 98 %   05/27/22 1250 -- -- -- 72 (!) 9 100 %   05/27/22 1249 -- -- -- 88 13 98 %   05/27/22 1246 -- -- -- 70 (!) 9 100 %   05/27/22 1245 -- -- -- 80 17 100 %   05/27/22 1242 123/78 -- -- 78 (!) 8 100 %   05/27/22 1238 106/68 -- -- 78 26 100 %   05/27/22 1237 -- -- -- 80 16 100 %   05/27/22 1236 -- -- -- 81 20 95 %   05/27/22 1232 125/75 -- -- 78 16 98 %   05/27/22 1230 -- -- -- 83 12 97 %   05/27/22 1225 -- -- -- 81 -- --   05/27/22 1222 114/72 -- -- 84 20 96 %   05/27/22 1220 (!) 96/58 -- -- 86 20 97 %   05/27/22 1216 -- -- -- 79 10 96 %   05/27/22 1215 -- -- -- 86 -- 96 %       General: alert, appears stated age, cooperative and no distress   Wound: Vandana dressing in place with tight seal and no drainage in container. Motion: Painful range of Motion   DVT Exam: No evidence of DVT seen on physical exam.  Negative Maximus's sign. No cords or calf tenderness. No significant calf/ankle edema. Patient laying in bed at time of exam, RN at bedside. Thigh has mild swelling, all compartments are soft and compressible. No erythema or ecchymosis seen beyond borders of dressing. NVI, sensation and motor function intact. Distal pulses 2+, regular. Foot warm and well perfused. Data Review  CBC:   Lab Results   Component Value Date    WBC 10.0 05/28/2022    RBC 3.37 05/28/2022    HGB 9.9 05/28/2022    HCT 28.7 05/28/2022     05/28/2022       Assessment:     Status Post right Total Hip Arthroplasty. Doing well postoperatively. Plan:      1: Continues current post-op course: Post op labs reviewed. Acute blood loss anemia noted, stable. No indication for transfusion at this time. 2:  Continue Deep venous thrombosis prophylaxis-  BID  3:  Continue physical therapy- WBAT with assistive device. Recommending home with [facility]. 4:  Continue Pain Control- Percocet PRN, scheduled tylenol. 5: Discharge today after working with PT. Follow up with Dr. Keith Dee in 2-3 weeks. DCP updated.      [email or web address], [employer]

## 2022-05-28 NOTE — DISCHARGE SUMMARY
Discharge Summary    Date:  2022    Name:  Isabella Zavala  Address:  [address] [place] [id number]    :  1960      Age:   65 y.o.    SSN:  [id number]      Medical Record Number:  [id number]  Admit Date: 2022  Date of Discharge:   Discharge condition: Stable. PROCEDURE:   [unfilled] (Right  Right)     REASON FOR ADMISSION:    Right hip trochanteric fracture and failure of total hip right knee osteoarhritis    Current Medications:     sodium chloride flush  5-40 mL IntraVENous 2 times per day    aspirin  325 mg Oral BID       Review of Systems:  Unchanged from preoperative H&P     Past Medical History:  Past Medical History:   Diagnosis Date    Carpal tunnel syndrome     bilateral    Foot pain     bilat - gotten worse    Neuropathy     Osteoarthritis        Past Surgical History:  Past Surgical History:   Procedure Laterality Date    HIP SURGERY Bilateral 2002    JOINT REPLACEMENT Bilateral     bilat hip replacement    JOINT REPLACEMENT Bilateral     shoulder replacement    SHOULDER SURGERY Bilateral     TONSILLECTOMY         Medications:  No current facility-administered medications on file prior to encounter. Current Outpatient Medications on File Prior to Encounter   Medication Sig Dispense Refill    nystatin (MYCOSTATIN) 677207 UNIT/ML suspension Take 500,000 Units by mouth 4 times daily         Allergies:  No Known Allergies    Social History:  Social History     Socioeconomic History    Marital status: Life Partner     Spouse name: Not on file    Number of children: Not on file    Years of education: Not on file    Highest education level: Not on file   Occupational History    Not on file   Tobacco Use    Smoking status: Current Some Day Smoker    Smokeless tobacco: Never Used    Tobacco comment: infrequently smokes a pipe or cigarette - hasn't had any since    Substance and Sexual Activity    Alcohol use:  Yes     Alcohol/week: 0.0 swelling.     Laboratory:  Admission on 05/27/2022   Component Date Value    ABO/Rh 05/27/2022 O POS     Antibody Screen 05/27/2022 NEG     Hemoglobin A1C 05/27/2022 5.2     eAG 05/27/2022 102.5     Protime 05/27/2022 13.2     INR 05/27/2022 1.02     aPTT 05/27/2022 27.5     CRP 05/27/2022 <3.0     Cell Count Fluid Type 05/27/2022 Hip   Right     Color, Fluid 05/27/2022 Red     Appearance, Fluid 05/27/2022 Bloody     Clot Eval. 05/27/2022 see below     Nucl Cell, Fluid 05/27/2022 222     RBC, Fluid 05/27/2022 2,772,500     Neutrophil Count, Fluid 05/27/2022 21     Lymphocytes, Body Fluid 05/27/2022 36     Eos, Fluid 05/27/2022 3     Macrophages 05/27/2022 40     Number of Cells Counted * 05/27/2022 100     Fungus Stain 05/27/2022 No Fungal elements seen     Gram Stain Result 05/27/2022                      Value:1+ WBC's  4+ RBC's  No organisms seen      Fungus Stain 05/27/2022 No Fungal elements seen     Gram Stain Result 05/27/2022 No WBCs or organisms seen     Sodium 05/28/2022 136     Potassium 05/28/2022 4.5     Chloride 05/28/2022 104     CO2 05/28/2022 25     Anion Gap 05/28/2022 7     Glucose 05/28/2022 144*    BUN 05/28/2022 16     CREATININE 05/28/2022 0.7*    GFR Non- 05/28/2022 >60     GFR  05/28/2022 >60     Calcium 05/28/2022 8.9     WBC 05/28/2022 10.0     RBC 05/28/2022 3.37*    Hemoglobin 05/28/2022 9.9*    Hematocrit 05/28/2022 28.7*    MCV 05/28/2022 85.1     MCH 05/28/2022 29.5     MCHC 05/28/2022 34.6     RDW 05/28/2022 15.4     Platelets 29/51/2411 183     MPV 05/28/2022 7.5       Recent Results (from the past 24 hour(s))   Cell Count with Differential, Body Fluid    Collection Time: 05/27/22  8:40 AM   Result Value Ref Range    Cell Count Fluid Type Hip   Right     Color, Fluid Red     Appearance, Fluid Bloody     Clot Eval. see below     Nucl Cell, Fluid 222 /cumm    RBC, Fluid 2,772,500 /cumm    Neutrophil Count, Fluid 21 %    Lymphocytes, Body Fluid 36 %    Eos, Fluid 3 %    Macrophages 40 %    Number of Cells Counted Fluid 100    Culture, Fungus    Collection Time: 05/27/22  8:42 AM    Specimen: Joint, Hip; Joint/Joint Fluid   Result Value Ref Range    Fungus Stain No Fungal elements seen    Culture, Aerorbic and Anarobic, Joint    Collection Time: 05/27/22  8:42 AM    Specimen: Joint, Hip; Joint/Joint Fluid   Result Value Ref Range    Gram Stain Result 1+ WBC's  4+ RBC's  No organisms seen      Culture, Fungus    Collection Time: 05/27/22  8:56 AM    Specimen: Hip; Joint/Joint Fluid   Result Value Ref Range    Fungus Stain No Fungal elements seen    Culture, Aerorbic and Anarobic, Joint    Collection Time: 05/27/22  8:56 AM    Specimen: Hip; Joint/Joint Fluid   Result Value Ref Range    Gram Stain Result No WBCs or organisms seen    Basic Metabolic Panel    Collection Time: 05/28/22  5:16 AM   Result Value Ref Range    Sodium 136 136 - 145 mmol/L    Potassium 4.5 3.5 - 5.1 mmol/L    Chloride 104 99 - 110 mmol/L    CO2 25 21 - 32 mmol/L    Anion Gap 7 3 - 16    Glucose 144 (H) 70 - 99 mg/dL    BUN 16 7 - 20 mg/dL    CREATININE 0.7 (L) 0.8 - 1.3 mg/dL    GFR Non-African American >60 >60    GFR African American >60 >60    Calcium 8.9 8.3 - 10.6 mg/dL   CBC    Collection Time: 05/28/22  5:16 AM   Result Value Ref Range    WBC 10.0 4.0 - 11.0 K/uL    RBC 3.37 (L) 4.20 - 5.90 M/uL    Hemoglobin 9.9 (L) 13.5 - 17.5 g/dL    Hematocrit 28.7 (L) 40.5 - 52.5 %    MCV 85.1 80.0 - 100.0 fL    MCH 29.5 26.0 - 34.0 pg    MCHC 34.6 31.0 - 36.0 g/dL    RDW 15.4 12.4 - 15.4 %    Platelets 027 697 - 259 K/uL    MPV 7.5 5.0 - 10.5 fL     No results found for this or any previous visit. No results found for this or any previous visit. No results found for this or any previous visit. No results found for this or any previous visit.       HOSPITAL COURSE:  Mitul Montiel is a 65 y.o. patient who was admitted to the hospital on the day of surgery. Surgery went as planned. After surgery, the patient was admitted to the Med/Surg unit for postoperative care. Postoperative course was uneventful. The patient tolerated a regular diet and had good pain control on oral pain medication. Discharged occurred on . DISCHARGE INSTRUCTIONS:  --  Weightbearing pwb to 50% as tolerated. --  May take shower. Pat the incision dry with clean towel. Keep incision clean and dry. --  Resume pre-op diet. --  Check temperature twice daily. --  Total joint precautions. --  Oxycodone for pain. --  Resume home medications per PCP. --  Follow up with orthopaedic surgeon in two weeks. --  Follow up with primary care physician within 6-8 weeks. --  For any questions or concerns, call [phone] or go to the nearest emergency department.       Iva Benites MD    Date:  5/28/2022

## 2022-05-28 NOTE — CONSULTS
Hospital Medicine  Consult History & Physical        Chief Complaint:  Right hip pain    Date of Service: Pt seen/examined in consultation on 5/27/2022    History Of Present Illness:      65 y.o. male who we are asked to see/evaluate by Vladimir Jalloh MD for medical management. The patient reports a several year hx of progressive right hip pain pain 2nd to OA that is rated 10/10, worse with ambulation and relieved to some degree by rest and analgesic medication. This has progressed to the point that the analgesic meds are minimally effective and the patient decided to undergo an elective right total hip arthroplasty. The patient reports good post-operative pain control w/out associated fevers/chills or other signs/sxs of systemic illness. The patient denies any associated cardiopulmonary c/o such as CP/SOB. Denies any significant post-op N/V. Past Medical History:        Diagnosis Date    Carpal tunnel syndrome     bilateral    Foot pain     bilat - gotten worse    Neuropathy     Osteoarthritis        Past Surgical History:        Procedure Laterality Date    HIP SURGERY Bilateral 2002    JOINT REPLACEMENT Bilateral     bilat hip replacement    JOINT REPLACEMENT Bilateral     shoulder replacement    SHOULDER SURGERY Bilateral 2002    TONSILLECTOMY         Medications Prior to Admission:    Prior to Admission medications    Medication Sig Start Date End Date Taking? Authorizing Provider   aspirin 325 MG EC tablet Take 1 tablet by mouth in the morning and at bedtime 5/27/22 7/26/22 Yes REYNA Lombardi   cefadroxil (DURICEF) 500 MG capsule Take 1 capsule by mouth 2 times daily for 10 days 5/27/22 6/6/22 Yes REYNA Lombardi   predniSONE (DELTASONE) 10 MG tablet Take 1 tablet by mouth daily for 10 days 5/27/22 6/6/22 Yes REYNA Lombardi   oxyCODONE-acetaminophen (PERCOCET)  MG per tablet Take 1 tablet by mouth every 4 hours as needed for Pain for up to 7 days.  Intended Refill: Brisk,3 seconds, normal   Peripheral Pulses: +2 palpable, equal bilaterally     Labs:     No results for input(s): WBC, HGB, HCT, PLT in the last 72 hours. No results for input(s): NA, K, CL, CO2, BUN, CREATININE, CALCIUM, PHOS in the last 72 hours. Invalid input(s): MAGNES  No results for input(s): AST, ALT, BILIDIR, BILITOT, ALKPHOS in the last 72 hours. Recent Labs     05/27/22  0655   INR 1.02     No results for input(s): Shannon Ojeda in the last 72 hours. Urinalysis:  No results found for: Galdino Mueller, BACTERIA, [address] [facility], BLOODU, [address], [facility] [address]    Radiology: I have reviewed the radiology reports with the following interpretation:     XR PELVIS (1-2 VIEWS)   Final Result   Right total hip revision arthroplasty in satisfactory alignment in the AP   view. No evidence of hardware complication. XR KNEE RIGHT (1-2 VIEWS)   Preliminary Result   Intraprocedural fluoroscopic spot images as above. See separate procedure   report for more information. FLUORO FOR SURGICAL PROCEDURES   Final Result      XR CHEST PORTABLE   Final Result   Mild blunting of the left costophrenic angle either due to trace pleural   fluid or pleural thickening. Linear opacities are seen in the left lung   base, most likely atelectasis or scar         XR HIP RIGHT (2-3 VIEWS)    (Results Pending)           ASSESSMENT:    Active Hospital Problems    Diagnosis Date Noted    Status post revision of total hip [Z96.649] 05/27/2022     Priority: Medium    Complication of internal right hip prosthesis ([facility] [address] [K72. 9XXA, Z96.641]      Priority: Medium       PLAN:    Right hip arthroplasty, revision 5/27/22  - Management per ortho. - no chest pain, SOB, hypoxia, n/v    Transient hypoxia  - likely related to anesthesia and IV pain meds. - continuous pulse ox  - now resolved     DVT Prophylaxis: SCDs  Diet: ADULT DIET;  Regular  Code Status: Prior    PT/OT Eval Status: Active and ongoing    Dispo - per primary team -  No medical barriers to discharge    We will sign off. Please call with questions.     Thank you for the consultation, will follow up as needed    Electronically signed by NANCY Kingsley CNP on 5/27/22 at 10:47 PM EDT

## 2022-05-28 NOTE — PROGRESS NOTES
Pt was seen and staffed by my colleague and separate attending. I did see pt on 05/28 and he remains on RA. No acute concerns. Pt being discharged by primary team.    No remarkable labs noted. VSS.

## 2022-05-28 NOTE — PROGRESS NOTES
Pt's IV line removed without complications. Discussed d/c instructions with patient, given opportunity to ask questions, and provided new medication education with side effects. Follow up appointment information included in d/c instructions. Pt verbalized understanding of d/c instructions. Patient was discharged to home with all belongings and taken outside via wheelchair.

## 2022-05-28 NOTE — OP NOTE
[facility]                 Stephanie Sultana                                OPERATIVE REPORT    PATIENT NAME: Mono Washington                        :        1960  MED REC NO:   [id number]                          ROOM:       0218  ACCOUNT NO:   [de-identified]                           ADMIT DATE: 2022  PROVIDER:     Arpit Dowell MD    DATE OF PROCEDURE:  2022    PREOPERATIVE DIAGNOSES:  Right total hip arthroplasty with trochanteric  fracture and subluxation, right knee osteoarthritis. POSTOPERATIVE DIAGNOSES:  Right total hip arthroplasty with trochanteric  fracture and subluxation, right knee osteoarthritis. SURGEON:  Arpit Dowell MD    ASSISTANT SURGEON:  Jillian Blake PA-C    ANESTHESIA:  General.    ESTIMATED BLOOD LOSS:  300 mL. COMPLICATIONS:  None. INDICATIONS:  The patient is a 76-year-old gentleman with significant  history of metal-on-metal hip arthroplasty done approximately 18 years  previously. He had tolerated the surgery well and had a recent fall  with trochanteric fracture and nonunion. He subsequently began having  issues with subluxation of his femoral head which had exaggerated after  the trochanteric fracture. This created a significant degree of pain  and exacerbated his knee osteoarthritis on the right side as well. He  has had a total hp arthroplasty on the left side, also has sensitivity  and has subluxation as well. Risks and benefits were explained,  including hardware failure, wound complications, anesthesia-related  issues, potential for infection, wound complications, necessity for  additional revision. OPERATIVE PROCEDURE:  He was brought to the operating room in stable  condition, underwent induction of general anesthesia, insertion of  endotracheal tube. The right knee was approached first.  These are  noted in operative note that has already been written.   This secondary  portion is relative to the total hip arthroplasty. After the knee geniculate block and PRP injection were completed, the  patient was placed in the left lateral decubitus position. Bony  prominences were padded and using a pegboard, he was stabilized. The  right leg was prepped with a ChloraPrep scrub, draped in a sterile  fashion. Previous incision was well marked. Betadine Steri-Drape was  applied over the leg. Tranexamic acid and antibiotics were given. A  time-out was obtained. A curvilinear incision was made over the  previous incision extending slightly distal and extending down the leg. The incision was extended down to subcutaneous tissues. The fascia was  identified. Significant fluid was noted underneath the fascia. Aspiration was done and this was found to be distinctly bloody tissues,  but nucleated cells were 222. The incision was extended into the deeper  tissues into the fluid cavity and then extended up between the split  between gluteus and tensor fascia giuliano. Underlying abductors were  identified and this was reflected along with the trochanteric fracture  superiorly and laterally. Therefore, abductors were not incised at all  during the case; however, retractors were placed and cultures were  obtained as well as soft tissue sample. Capsular and deep tissues were  identified as being very redundant. A large amount of metal debris was  present and this was debrided during the case. As the case continued,  resection of a great deal of the capsule that was tinged with metal  debris was done. Underlying femoral head and neck were exposed and much  of the proximal aspect of the body of the femur was exposed; however,  the stem was very stable within its bed despite the trochanteric  fracture and osteolysis. There was an attempt to remove the stem, but  this was met with significant resistance and absolutely no motion in the  stem at all.     Attention was drawn to the acetabulum. Retractors were placed and  acetabulum was found to be stable; however, there was lysis of bone  around the acetabulum. Retractors were placed. Then using an explant  system, the acetabulum was sequentially osteotomized until this was  freed up. Behind the acetabulum, there was significant amount of metal  debris and large vacuous areas of loss of bone, scoops of soft  gelatinous material with metal debris was taken out from the back of the  acetabulum. The cavitary defect extended throughout this acetabular  area extending superiorly, inferiorly, extending through the posterior  wall and superior posterior column. Columns, however were intact. Reaming was subsequently done to a 59 reamer. Trialing was done until  62, cup was thought to be the most appropriate for the space. There was  a soft tissue base of the acetabulum which felt as though medial wall  had been violated. Postoperative x-rays, however, failed to show this. A 62 cup was  impacted into place. This was a Ignacio cup and then two screws were  placed into the residual good bone which was anterosuperiorly, a 25-mm  and a 30-mm screw, and +5 44 neutral liner was impacted into place. Then trialing was done of the head, still mobility was not chosen, it  was a +2 ceramic head and it was felt to be very stable. This was  impacted into place. Reduction obtained. Trochanteric  and claw  were obtained. The inner aspect of the trochanter was cleared of metal  and soft tissue debris. Bone was incidentally placed behind the  acetabulum, reverse reamed as well as injection of additional  dissolvable bone cement. The trochanteric claw was attached to the main  body of the femur with three individual cables. This was a Synthes type  trochanteric attachment device. Once tightened, this was crimped into  place.   Additional bone graft was placed around the base of the  trochanter and closure was obtained with interrupted Ethibond sutures  for the gluteus and abductor mechanism to a soft tissue envelope deeper,  essentially the vastus lateralis. Fascial closure was obtained with  interrupted Ethibond and then running Stratafix suture. Final closures  were obtained with interrupted Monocryl for the subcutaneous tissues and  then Dermabond and Prineo with vacuum dressing. Ceramic head was  utilized. The patient tolerated the procedure well, placed in an  abduction pillow, and left the operating room in stable condition.         José Miguel Abdi MD    D: 05/28/2022 8:14:01       T: 05/28/2022 9:54:29     [email or web address]  Job#: [id number]     Doc#: [id number]    CC:

## 2022-05-28 NOTE — PROGRESS NOTES
Occupational Therapy/Physical Therapy  Per Ortho MD pt to be discharged at this time, and to have no PT or OT this date.    TOMAS Moore/FAITH Bruce [place]

## 2022-05-31 ENCOUNTER — TELEPHONE (OUTPATIENT)
Dept: ORTHOPEDIC SURGERY | Age: 62
End: 2022-05-31

## 2022-05-31 RX ORDER — MORPHINE SULFATE 4 MG/ML
4 INJECTION, SOLUTION INTRAMUSCULAR; INTRAVENOUS ONCE
Status: ACTIVE | OUTPATIENT
Start: 2022-05-27

## 2022-05-31 NOTE — TELEPHONE ENCOUNTER
Attempted to contact pt, left voicemail with purpose and call back number. Alisa Gipson  Orthopedic Nurse Navigator  Phone number: [phone]    Follow up appointments:    No future appointments. Left message for office to schedule post op appt.

## 2022-05-31 NOTE — TELEPHONE ENCOUNTER
General Question     Subject: PT RETURNING CALL HE MISSED  Patient:NigelElisabet chairez \"JOE  Contact Number:[phone]

## 2022-05-31 NOTE — TELEPHONE ENCOUNTER
Appointment Request     Patient requesting earlier appointment: Yes  Appointment offered to patient: NO  Patient Contact Number: [phone]    PATIENT IS NEEDING POST OP APPT. DID HAVE RT HIP SX THIS MORNING. PATIENT IS ALSO NEEDING A PRESCRIPTION FOR WALKER. PLEASE RETURN CALL AT ABOVE NUMBER.

## 2022-06-02 RX ORDER — OXYCODONE AND ACETAMINOPHEN 10; 325 MG/1; MG/1
1 TABLET ORAL EVERY 8 HOURS PRN
Qty: 30 TABLET | Refills: 0 | Status: SHIPPED | OUTPATIENT
Start: 2022-06-02 | End: 2022-06-09 | Stop reason: SDUPTHER

## 2022-06-08 ENCOUNTER — TELEPHONE (OUTPATIENT)
Dept: ORTHOPEDIC SURGERY | Age: 62
End: 2022-06-08

## 2022-06-08 NOTE — TELEPHONE ENCOUNTER
Attempted to contact pt, left voicemail with purpose and call back number. Lottie Stroud  Orthopedic Nurse Navigator  Phone number: [phone]    Follow up appointments:    Future Appointments   Date Time Provider Bev Tata   6/9/2022 11:30 AM Jessee Chilel MD [facility]     Signed off from pt. Instructed pt to call RN or Surgeon's office with any issues or concerns.

## 2022-06-09 ENCOUNTER — OFFICE VISIT (OUTPATIENT)
Dept: ORTHOPEDIC SURGERY | Age: 62
End: 2022-06-09

## 2022-06-09 VITALS — HEIGHT: 73 IN | BODY MASS INDEX: 25.18 KG/M2 | WEIGHT: 190 LBS

## 2022-06-09 DIAGNOSIS — Z96.649 STATUS POST REVISION OF TOTAL HIP: Primary | ICD-10-CM

## 2022-06-09 DIAGNOSIS — M16.0 PRIMARY OSTEOARTHRITIS OF BOTH HIPS: ICD-10-CM

## 2022-06-09 PROCEDURE — 99024 POSTOP FOLLOW-UP VISIT: CPT | Performed by: PHYSICIAN ASSISTANT

## 2022-06-09 RX ORDER — OXYCODONE AND ACETAMINOPHEN 10; 325 MG/1; MG/1
1 TABLET ORAL EVERY 4 HOURS PRN
Qty: 42 TABLET | Refills: 0 | Status: SHIPPED | OUTPATIENT
Start: 2022-06-09 | End: 2022-06-12 | Stop reason: SDUPTHER

## 2022-06-09 RX ORDER — GABAPENTIN 100 MG/1
100 CAPSULE ORAL 3 TIMES DAILY
Qty: 90 CAPSULE | Refills: 5 | Status: SHIPPED | OUTPATIENT
Start: 2022-06-09 | End: 2022-08-11

## 2022-06-09 NOTE — PROGRESS NOTES
Patient Name: Alyson Cheney MRN: [id number]   Age: 65 y.o. YOB: 1960   Sex: male         [address] Complaint   Patient presents with    Post-Op Check     RIGHT CONNER REVISION 5/27/22       HISTORY OF PRESENT ILLNESS   Patient returns for their first postoperative evaluation status post total hip replacement. The patient is doing very well. They have moderate complaints of pain. Notes that there is a lot of pain that he is trying to manage the pain medication. Having some aggravation to the neuropathy affected the foot     There is moderate swelling about the Hip. The patient has been doing home physical therapy. They deny any calf swelling or numbness or tingling down the leg     Assessment   PHYSICAL EXAM   Vital Signs: There were no vitals filed for this visit. Examination of the hip shows a well-healed incision. Edges are well opposed. There is mild swelling. There is no drainage. Range of motion is up to 85. There is no calf tenderness. The patient is neurovascularly intact. No signs of DVT. Calf nontender    Gross distal sensation is decreased in the lateral thigh from the area of the incision as expected. RADIOLOGY   X-Rays reviewed: two view of the right hip AP and lateral x-rays of the hip show excellent alignment of the total hip prosthesis. There is no loosening no fractures noted. IMPRESSION   2 weeks status post right total hip replacement    PLAN   The patient is doing well 2 weeks status post right total hip replacement. ICD-10-CM    1. Status post revision of total hip  Z96.649 XR HIP 2-3 VW W PELVIS RIGHT   2. Primary osteoarthritis of both hips  M16.0 oxyCODONE-acetaminophen (PERCOCET)  MG per tablet     We will continue home physical therapy for aggressive range of motion and strengthening. Follow up in 2 weeks for re-eval and suture removal   Refilled pain medication   Gave script for gabapentin 100 mg tid.    Gave rx for nystatin mouthwash for thrush     They will continue ice and elevation for the hip. They will continue aspirin therapy for DVT prophylaxis.

## 2022-06-10 ENCOUNTER — TELEPHONE (OUTPATIENT)
Dept: FAMILY MEDICINE CLINIC | Age: 62
End: 2022-06-10

## 2022-06-10 NOTE — TELEPHONE ENCOUNTER
----- Message from [address] sent at 6/10/2022 12:17 PM EDT -----  Subject: Appointment Request    Reason for Call: New Patient Request Appointment    QUESTIONS  Type of Appointment? New Patient/New to Provider  Reason for appointment request? No appointments available during search  Additional Information for Provider? Patient is trying to establish with   Gaurang Brown. Is he or anyone in office accepting new patients? Please   reach out to patient to schedule.  ---------------------------------------------------------------------------  --------------  CALL BACK INFO  What is the best way for the office to contact you? OK to leave message on   voicemail  Preferred Call Back Phone Number? [phone]  ---------------------------------------------------------------------------  --------------  SCRIPT ANSWERS  Relationship to Patient? Self  Specialty Confirmation? Primary Care  (Is the patient requesting their annual physical and does not need PAP or   AWV per above?)? Yes   Have you been diagnosed with COVID-19 in the past 10 days? No  (Service Expert  click yes below to proceed with [employer]   Scheduling)? Yes  Have you been diagnosed with COVID-19 in the past 10 days? No  (Service Expert  click yes below to proceed with [employer] Usual   Scheduling)? Yes  Is this the first appointment to establish care for a ?  No

## 2022-06-12 DIAGNOSIS — M16.0 PRIMARY OSTEOARTHRITIS OF BOTH HIPS: ICD-10-CM

## 2022-06-12 RX ORDER — OXYCODONE AND ACETAMINOPHEN 10; 325 MG/1; MG/1
1 TABLET ORAL EVERY 4 HOURS PRN
Qty: 42 TABLET | Refills: 0 | Status: SHIPPED | OUTPATIENT
Start: 2022-06-12 | End: 2022-06-22 | Stop reason: SDUPTHER

## 2022-06-20 LAB
CULTURE, JOINT AEROBIC: NORMAL
CULTURE, JOINT AEROBIC: NORMAL
CULTURE, JOINT ANAEROBIC: NORMAL
CULTURE, JOINT ANAEROBIC: NORMAL
GRAM STAIN RESULT: NORMAL
GRAM STAIN RESULT: NORMAL

## 2022-06-22 ENCOUNTER — OFFICE VISIT (OUTPATIENT)
Dept: ORTHOPEDIC SURGERY | Age: 62
End: 2022-06-22

## 2022-06-22 VITALS — WEIGHT: 190 LBS | BODY MASS INDEX: 25.18 KG/M2 | HEIGHT: 73 IN

## 2022-06-22 DIAGNOSIS — Z96.649 STATUS POST REVISION OF TOTAL HIP: Primary | ICD-10-CM

## 2022-06-22 DIAGNOSIS — M16.0 PRIMARY OSTEOARTHRITIS OF BOTH HIPS: ICD-10-CM

## 2022-06-22 PROCEDURE — MISCD282 ADJUSTA LIFT: Performed by: ORTHOPAEDIC SURGERY

## 2022-06-22 PROCEDURE — 99024 POSTOP FOLLOW-UP VISIT: CPT | Performed by: ORTHOPAEDIC SURGERY

## 2022-06-22 RX ORDER — OXYCODONE AND ACETAMINOPHEN 10; 325 MG/1; MG/1
1 TABLET ORAL EVERY 4 HOURS PRN
Qty: 42 TABLET | Refills: 0 | Status: SHIPPED | OUTPATIENT
Start: 2022-06-22 | End: 2022-06-29

## 2022-06-22 RX ORDER — NALOXONE HYDROCHLORIDE 4 MG/.1ML
1 SPRAY NASAL PRN
Qty: 1 EACH | Refills: 5 | Status: SHIPPED | OUTPATIENT
Start: 2022-06-22

## 2022-06-22 NOTE — PROGRESS NOTES
Patient Name: Alicia Gao MRN: [id number]   Age: 65 y.o. YOB: 1960   Sex: male         [address] Complaint   Patient presents with    Post-Op Check     RIGHT CONNER REVISION 5/28/22       HISTORY OF PRESENT ILLNESS   Patient returns for their secondpostoperative evaluation status post total hip replacement. The patient is doing very well. Yeast wash resolved     They have moderate complaints of pain. Notes that there is a lot of pain that he is trying to manage the pain medication. Having some aggravation to the neuropathy affected the foot     There is moderate swelling about the Hip. The patient has been doing home physical therapy. bilateral calf swelling. Pain radiation down the leg. Assessment   PHYSICAL EXAM   Vital Signs: There were no vitals filed for this visit. Examination of the hip shows a well-healed incision. Edges are well opposed. There is mild swelling. There is no drainage. Range of motion is up to 85. There is no calf tenderness. The patient is neurovascularly intact. No signs of DVT. Calf nontender    Gross distal sensation is decreased in the lateral thigh from the area of the incision as expected. RADIOLOGY   X-Rays reviewed: two view of the right hip AP and lateral x-rays of the hip show excellent alignment of the total hip prosthesis. There is no loosening no fractures noted. IMPRESSION   3 weeks status post right total hip replacement    PLAN   The patient is doing well 3 weeks status post right total hip replacement. ICD-10-CM    1. Status post revision of total hip  Z96.649      We will continue home physical therapy for aggressive range of motion and strengthening. Follow up in 2 weeks   Refilled pain medication     Sutures were removed  Left provided for the opposite hip with marked improvement in mobilization and pain    They will continue ice and elevation for the hip.     They will continue aspirin therapy for DVT prophylaxis.

## 2022-06-27 LAB
FUNGUS (MYCOLOGY) CULTURE: NORMAL
FUNGUS (MYCOLOGY) CULTURE: NORMAL
FUNGUS STAIN: NORMAL
FUNGUS STAIN: NORMAL

## 2022-07-12 ENCOUNTER — TELEPHONE (OUTPATIENT)
Dept: INTERNAL MEDICINE CLINIC | Age: 62
End: 2022-07-12

## 2022-07-12 LAB
AFB CULTURE (MYCOBACTERIA): NORMAL
AFB CULTURE (MYCOBACTERIA): NORMAL
AFB SMEAR: NORMAL
AFB SMEAR: NORMAL

## 2022-07-12 NOTE — TELEPHONE ENCOUNTER
This person came in without a mask he was handed one, the  ask for his name, demo etc.. he ignored her and handed her his ID etc... patient still stood there with the mask in his hand not putting it on, I asked him to please put on his mask and said how am I suppose to breathe? I explained that every patient is expected to wear one and is wont wear his I would ask him to leave, he accused me of threaten   him, I said no sir there is a mask mandate in the office and that it wasn't a threat, he just stood staring at me, still not putting on the mask, he used a few choice daniele words I asked that he refrain from that type of language  He was speaking rudely and said this must be [place] type of \"shit place\"  I handed him his Id and cards back and asked him to leave, he walked out the door cussing.  Pt not seen here blocked from making future appointments behavior

## 2022-07-21 ENCOUNTER — OFFICE VISIT (OUTPATIENT)
Dept: ORTHOPEDIC SURGERY | Age: 62
End: 2022-07-21

## 2022-07-21 VITALS — WEIGHT: 190 LBS | HEIGHT: 73 IN | BODY MASS INDEX: 25.18 KG/M2

## 2022-07-21 DIAGNOSIS — Z96.649 STATUS POST REVISION OF TOTAL HIP: Primary | ICD-10-CM

## 2022-07-21 PROCEDURE — 99024 POSTOP FOLLOW-UP VISIT: CPT | Performed by: ORTHOPAEDIC SURGERY

## 2022-07-21 NOTE — PROGRESS NOTES
Patient Name: Susie Salcedo MRN: [id number]   Age: 65 y.o. YOB: 1960   Sex: male         [address] Complaint   Patient presents with    Post-Op Check     RIGHT CONNER REVISION 5/27/22, 6/10 pain with little activity, worse with activity       HISTORY OF PRESENT ILLNESS   Patient returns for their  postoperative evaluation status post total hip replacement. The patient is doing very well. Yeast wash resolved his oral thrush. No issues with the local incision. Slowly increased control of the hip and thigh although issues with lateral pain . Knee with less pain overall but continued issues with difficulty with walking without the walker. They have moderate complaints of pain. Notes that there is a lot of pain that he is trying to manage the pain medication. Having some aggravation to the neuropathy affected the foot     There is moderate swelling about the Hip. The patient has been doing home physical therapy. bilateral calf swelling improved. Pain radiation down the leg also improved with some complaints of back aching. Opposite hip with noted subluxation at times especially in bed. He was finally able to get ot bed however to his relief but unable to lie on the right side because of local irritation. Assessment   PHYSICAL EXAM   Vital Signs: There were no vitals filed for this visit. Examination of the hip shows a well-healed incision. Edges are well opposed. There is mild swelling. There is no drainage. Range of motion is up to 105. There is no calf tenderness. The patient is neurovascularly intact. No signs of DVT. Calf nontender    Gross distal sensation is decreased in the lateral thigh from the area of the incision as expected. Right knee with valgus angulation and left hip stable . LLD noted with need for shoe lift.        RADIOLOGY   X-Rays reviewed: two view of the right hip AP and lateral x-rays of the hip show excellent alignment of the total hip prosthesis. There is no loosening no fractures noted. Trochanter is not clearly visible but cables are not loosened at all. IMPRESSION   5 weeks status post right total hip replacement    PLAN   The patient is doing well 5 weeks status post right total hip replacement. ICD-10-CM    1. Status post revision of total hip  Z96.649 XR HIP 2-3 VW W PELVIS RIGHT        We will continue home physical therapy for aggressive range of motion and strengthening. Follow up in 3 weeks   No refill pain medication  as he is now enrolled in pain management. Left provided for the opposite hip with marked improvement in mobilization and pain but will require additional surgery for metal on metal head removal.   Right knee will require partial knee arthroplasty    They will continue ice and elevation for the hip. They will continue aspirin therapy for DVT prophylaxis. Have discussed that the right hip pain is expected and strength and comfort should both improve over time. Additional management could include knee bracing on the right and definitely assistive aids as he improves his control of the right hip.

## 2022-08-11 ENCOUNTER — OFFICE VISIT (OUTPATIENT)
Dept: ORTHOPEDIC SURGERY | Age: 62
End: 2022-08-11

## 2022-08-11 VITALS — WEIGHT: 190 LBS | BODY MASS INDEX: 25.18 KG/M2 | HEIGHT: 73 IN

## 2022-08-11 DIAGNOSIS — Z96.649 STATUS POST REVISION OF TOTAL HIP: Primary | ICD-10-CM

## 2022-08-11 PROCEDURE — 99024 POSTOP FOLLOW-UP VISIT: CPT | Performed by: ORTHOPAEDIC SURGERY

## 2022-08-11 RX ORDER — MELOXICAM 15 MG/1
TABLET ORAL
COMMUNITY
Start: 2022-06-27

## 2022-08-11 RX ORDER — GABAPENTIN 300 MG/1
CAPSULE ORAL
COMMUNITY
Start: 2022-07-19

## 2022-08-11 RX ORDER — OXYCODONE AND ACETAMINOPHEN 10; 325 MG/1; MG/1
TABLET ORAL
COMMUNITY
Start: 2022-07-19

## 2022-08-11 NOTE — PROGRESS NOTES
Patient Name: Rosa García MRN: [id number]   Age: 65 y.o. YOB: 1960   Sex: male         [address] Complaint   Patient presents with    Post-Op Check     RIGHT CONNER REVISION 5/27/22       HISTORY OF PRESENT ILLNESS   Patient returns for their  postoperative evaluation status post total hip replacement. The patient is doing very well. Gait he is able to ambulate with improved control although still has some tension at the proximal aspect of the right hip he does feel that the left hip in certain motions also creates a fairly significant grinding sensation but this is mostly at times when he is getting up from a sitting position or trying to  things from the floor with a fairly aggressive need for left hip control    Previously  Yeast wash resolved his oral thrush. No issues with the local incision. Slowly increased control of the hip and thigh although issues with lateral pain . Knee with less pain overall but continued issues with difficulty with walking without the walker. They have moderate complaints of pain. Notes that there is a lot of pain that he is trying to manage the pain medication. Having some aggravation to the neuropathy affected the foot     There is moderate swelling about the Hip. The patient has been doing home physical therapy. bilateral calf swelling improved. Pain radiation down the leg also improved with some complaints of back aching. Opposite hip with noted subluxation at times especially in bed. He was finally able to get ot bed however to his relief but unable to lie on the right side because of local irritation. Assessment   PHYSICAL EXAM   Vital Signs: There were no vitals filed for this visit. Examination of the hip shows a well-healed incision. Edges are well opposed. Localized pain is noted at the IT band and the greater trochanter area no signs of any active infection incision is well-healed.   Knee shows increasing valgus angulation which obviously has implications on the right hip control. Left hip demonstrates localized anterior rotation a tendency for increased control and abilities however today with improved flexion and abduction against resistance the right hip still has deficits in abduction    Circulation appears to be intact old neuropathy is still present and has not changed with the use of Neurontin on cold sensitivity testing he tends to have a significant amount of localized pain in the foot which radiates up the leg and creates some dysesthetic symptoms along the leg which is not present today but was noted on history of present illness     There is mild swelling. There is no drainage. Range of motion is up to 105. There is no calf tenderness. The patient is neurovascularly intact. No signs of DVT. Calf nontender    Gross distal sensation is decreased in the lateral thigh from the area of the incision as expected. Right knee with valgus angulation and left hip stable . LLD noted with need for shoe lift. RADIOLOGY   X-Rays reviewed: two view of the right hip AP and lateral x-rays of the hip show excellent alignment of the total hip prosthesis. There is no loosening no fractures noted. Trochanter is not clearly visible but cables are not loosened at all. IMPRESSION   7 weeks status post right total hip replacement    PLAN   The patient is doing well 7 weeks status post right total hip replacement. ICD-10-CM    1. Status post revision of total hip  Z96.649         We will continue home physical therapy for aggressive range of motion and strengthening. No refill pain medication  as he is now enrolled in pain management. We have recommended that he pursue partial knee arthroplasty on the right side lateral disease is present and fairly significant and may actually be increasing. Does not have any localized areas of fracture collapse however.   We recommended if partial knee is done as his next stage procedure that this should improve sensations on the right hip and may allow for delayed surgical intervention on his left side over time    Left provided for the opposite hip with marked improvement in mobilization and pain but will require additional surgery for metal on metal head removal.   Right knee will require partial knee arthroplasty    They will continue ice and elevation for the hip. They will continue aspirin therapy for DVT prophylaxis. Have discussed that the right hip pain is expected and strength and comfort should both improve over time. Additional management could include knee bracing on the right and definitely assistive aids as he improves his control of the right hip.

## 2022-09-18 NOTE — PROGRESS NOTES
[facility]      Patient Name: Pk Hess  Medical Record Number:  [id number]  Primary Care Physician:  No primary care provider on file. Date of Consultation: 9/19/2022    Chief Complaint:   Chief Complaint   Patient presents with    New Patient     Patient is here today for a lesion under his tongue. Patient states it all started when he had his teeth pulled a years ago. He states his denture lady thinks his teeth are causing it. Patient states it does not hurt but it is annoying. He does have a mouth wash and it makes it go away but then it will come back. HISTORY OF PRESENT ILLNESS  Jordan Epstein is a(n) 65 y.o. male who presents for evaluation of lesions on the tongue. He states that they come and go. He has nystatin which tends to make them disappear completely. He was seen by his dentist and had his teeth filed down and had new dentures fitted which he feels will help the lesions not return. He denies any antibiotic use preceding these lesions. They are not painful. He says that they come and go quite easily. He has never been a habitual smoker but does occasionally have a cigar or pipe.       Patient Active Problem List   Diagnosis    OA (osteoarthritis)    Closed displaced comminuted fracture of shaft of right femur (HCC)    Right knee pain    Complication of internal right hip prosthesis ([address]    Status post revision of total hip     Past Surgical History:   Procedure Laterality Date    HIP SURGERY Bilateral 2002    JOINT REPLACEMENT Bilateral     bilat hip replacement    JOINT REPLACEMENT Bilateral     shoulder replacement    MEDICATION INJECTION Right 5/27/2022    RIGHT KNEE GENICULAR NERVE BLOCK WITH INTRA ARTICULAR INJECTION UNDER FLUOROSCOPIC GUIDANCE performed by Ivet Novak MD at [place] [address] Right 5/27/2022    REVISION RIGHT TOTAL HIP ARTHROPLASTY          [facility] CHETAN Zimmerman performed by Juan Pablo Briscoe MD at [facility] Bilateral 2002    TONSILLECTOMY       No family history on file. Social History     Socioeconomic History    Marital status: Life Partner     Spouse name: Not on file    Number of children: Not on file    Years of education: Not on file    Highest education level: Not on file   Occupational History    Not on file   Tobacco Use    Smoking status: Never    Smokeless tobacco: Never    Tobacco comments:     infrequently smokes a pipe or cigarette - hasn't had any since September 17   Substance and Sexual Activity    Alcohol use: Yes     Alcohol/week: 0.0 standard drinks     Comment: occassional    Drug use: Never    Sexual activity: Yes     Partners: Female   Other Topics Concern    Not on file   Social History Narrative    Not on file     Social Determinants of Health     Financial Resource Strain: Not on file   Food Insecurity: Not on file   Transportation Needs: Not on file   Physical Activity: Not on file   Stress: Not on file   Social Connections: Not on file   Intimate Partner Violence: Not on file   Housing Stability: Not on file       DRUG/FOOD ALLERGIES: Patient has no known allergies. CURRENT MEDICATIONS  Prior to Admission medications    Medication Sig Start Date End Date Taking?  Authorizing Provider   oxyCODONE-acetaminophen (PERCOCET)  MG per tablet  7/19/22  Yes Historical Provider, MD   meloxicam (MOBIC) 15 MG tablet TAKE 1 TABLET BY MOUTH EVERY DAY FOR 30 DAYS 6/27/22  Yes Historical Provider, MD   gabapentin (NEURONTIN) 300 MG capsule  7/19/22  Yes Historical Provider, MD   naloxone 4 MG/0.1ML LIQD nasal spray 1 spray by Nasal route as needed for Opioid Reversal 6/22/22  Yes Juan Pablo Briscoe MD   aspirin 325 mg EC tablet Take 1 tablet by mouth 2 times daily 5/28/22  Yes Juan Pablo Briscoe MD   nystatin (MYCOSTATIN) 212194 UNIT/ML suspension Take 500,000 Units by mouth 4 times daily   Yes Historical Provider, MD   aspirin 325 MG EC tablet Take 1 tablet by mouth in the morning and at bedtime 5/27/22 7/26/22  REYNA Mejia       REVIEW OF SYSTEMS  The following systems were reviewed and revealed the following in addition to any already discussed in the HPI:    Review of Systems   Constitutional:  Negative for fatigue and fever. HENT:  Positive for mouth sores. Negative for congestion, ear pain, postnasal drip, rhinorrhea and sneezing. Eyes:  Negative for pain and visual disturbance. Respiratory:  Negative for cough and shortness of breath. Cardiovascular:  Negative for chest pain. Gastrointestinal:  Negative for nausea and vomiting. Endocrine: Negative. Genitourinary: Negative. Musculoskeletal:  Negative for neck pain and neck stiffness. Skin:  Negative for rash. Neurological:  Negative for dizziness and headaches. PHYSICAL EXAM  /70 (Site: Right Upper Arm, Position: Sitting)   Pulse 93   Temp 98 °F (36.7 °C) (Infrared)   Ht 6' 1\" (1.854 m)   Wt 190 lb (86.2 kg)   BMI 25.07 kg/m²     GENERAL: No Acute Distress, Alert and Oriented, no hoarseness  EYES: EOMI, Anti-icteric  NOSE: No epistaxis, nasal mucosa within normal limits, no purulent drainage  EARS: Normal external canal appearance, EAC patent bilaterally, TMs intact bilaterally with no evidence of effusions  FACE: 1/6 House-Brackmann Scale, symmetric, sensation equal bilaterally  ORAL CAVITY: No masses or lesions palpated, uvula is midline, moist mucous membranes, bilateral slit shaped lesions of the inferior tongue that appear ulcerative nature. NECK: Normal range of motion, no thyromegaly, trachea is midline, no lymphadenopathy, no neck masses, no crepitus  CHEST: Normal respiratory effort, no retractions, breathing comfortably  SKIN: No rashes, normal appearing skin, no evidence of skin lesions/tumors    RADIOLOGY  Summary of findings:    PROCEDURE      ASSESSMENT/PLAN  Merlene Irene is a very pleasant 65 y.o. male with     1.  Lesion of tongue  Lesions appear reassuring and are symmetric. They have vary in size and sometimes resolve completely. Photodocumentation was obtained for monitoring purposes. He is going to start a new mouthwash and have his new dentures fitted to see if this will change anything. He will follow-up in approximate 1 month to ensure these lesions have either improved or gone away. Medical Decision Making:   The following items were considered in medical decision making:  Independent review of images  Review / order clinical lab tests  Review / order radiology tests  Decision to obtain old records

## 2022-09-19 ENCOUNTER — OFFICE VISIT (OUTPATIENT)
Dept: ENT CLINIC | Age: 62
End: 2022-09-19
Payer: MEDICARE

## 2022-09-19 VITALS
WEIGHT: 190 LBS | HEIGHT: 73 IN | SYSTOLIC BLOOD PRESSURE: 122 MMHG | TEMPERATURE: 98 F | HEART RATE: 93 BPM | DIASTOLIC BLOOD PRESSURE: 70 MMHG | BODY MASS INDEX: 25.18 KG/M2

## 2022-09-19 DIAGNOSIS — K14.8 LESION OF TONGUE: Primary | ICD-10-CM

## 2022-09-19 PROCEDURE — 99203 OFFICE O/P NEW LOW 30 MIN: CPT | Performed by: STUDENT IN AN ORGANIZED HEALTH CARE EDUCATION/TRAINING PROGRAM

## 2022-09-19 ASSESSMENT — ENCOUNTER SYMPTOMS
RHINORRHEA: 0
EYE PAIN: 0
COUGH: 0
VOMITING: 0
SHORTNESS OF BREATH: 0
NAUSEA: 0

## 2022-09-23 ENCOUNTER — OFFICE VISIT (OUTPATIENT)
Dept: ORTHOPEDIC SURGERY | Age: 62
End: 2022-09-23
Payer: MEDICARE

## 2022-09-23 VITALS — BODY MASS INDEX: 25.18 KG/M2 | HEIGHT: 73 IN | WEIGHT: 190 LBS

## 2022-09-23 DIAGNOSIS — Z96.649 STATUS POST REVISION OF TOTAL HIP: Primary | ICD-10-CM

## 2022-09-23 DIAGNOSIS — M25.561 RIGHT KNEE PAIN, UNSPECIFIED CHRONICITY: ICD-10-CM

## 2022-09-23 DIAGNOSIS — M17.11 LOCALIZED OSTEOARTHRITIS OF RIGHT KNEE: ICD-10-CM

## 2022-09-23 PROCEDURE — L1810 KO ELASTIC WITH JOINTS: HCPCS | Performed by: ORTHOPAEDIC SURGERY

## 2022-09-23 PROCEDURE — 99214 OFFICE O/P EST MOD 30 MIN: CPT | Performed by: ORTHOPAEDIC SURGERY

## 2022-09-23 NOTE — PROGRESS NOTES
Dr López Robbins      Date /Time 9/23/2022       12:15 PM EDT  Name Anika Donovan             1960   Location  [facility]  MRN [id number]                Chief Complaint   Patient presents with    Knee Pain     RIGHT KNEE    Hip Pain     RIGHT HIP         History of Present Illness    Anika Donovan is a 65 y.o. male who presents with  right hip pain. Sent in consultation by No primary care provider on file., .    Occupation:  Disabled  Athletic/exercise activity: no sports. Injury Mechanism:  none. Worker's Comp. & legal issues:   none. Previous Treatments: Ice, Heat, and NSAIDs    Patient presents the office today for a new problem to us. Patient was a previous patient of Dr. Ashley Mustafa. Patient is here with a chief complaint of right hip pain. He did have a metal-on-metal total hip arthroplasty performed nearly 20 years ago. Then in 2021 he did fall and suffered a greater trochanteric fracture. He subsequently underwent revision total hip arthroplasty to a metal on plastic prosthesis and ORIF. He states the hip continues to improve but he does have some weakness. He denies any fever or chills. Patient is also here with a chief complaint of right knee pain. Patient states she has had a progressive advancement and malalignment. His pain is mostly concentrated lateral.  He does have increased pain with activities and improvement with rest.  He has had no previous injections.     Past History  Past Medical History:   Diagnosis Date    Carpal tunnel syndrome     bilateral    Foot pain     bilat - gotten worse    Neuropathy     Osteoarthritis      Past Surgical History:   Procedure Laterality Date    HIP SURGERY Bilateral 2002    JOINT REPLACEMENT Bilateral     bilat hip replacement    JOINT REPLACEMENT Bilateral     shoulder replacement    MEDICATION INJECTION Right 5/27/2022    RIGHT KNEE GENICULAR NERVE BLOCK WITH INTRA ARTICULAR INJECTION UNDER FLUOROSCOPIC GUIDANCE performed by Matt Burns Sebastian Montes MD at [facility] Right 5/27/2022    REVISION RIGHT TOTAL HIP ARTHROPLASTY          ROBISON & NEPHEW    Santosedilma Tavarez performed by Livia Martin MD at [facility] Bilateral 2002    TONSILLECTOMY       No family history on file. Social History     Tobacco Use    Smoking status: Never    Smokeless tobacco: Never    Tobacco comments:     infrequently smokes a pipe or cigarette - hasn't had any since September 17   Substance Use Topics    Alcohol use: Yes     Alcohol/week: 0.0 standard drinks     Comment: occassional      Current Outpatient Medications on File Prior to Visit   Medication Sig Dispense Refill    oxyCODONE-acetaminophen (PERCOCET)  MG per tablet       meloxicam (MOBIC) 15 MG tablet TAKE 1 TABLET BY MOUTH EVERY DAY FOR 30 DAYS      gabapentin (NEURONTIN) 300 MG capsule       naloxone 4 MG/0.1ML LIQD nasal spray 1 spray by Nasal route as needed for Opioid Reversal 1 each 5    aspirin 325 mg EC tablet Take 1 tablet by mouth 2 times daily 30 tablet 3    aspirin 325 MG EC tablet Take 1 tablet by mouth in the morning and at bedtime 60 tablet 1    nystatin (MYCOSTATIN) 315075 UNIT/ML suspension Take 500,000 Units by mouth 4 times daily       Current Facility-Administered Medications on File Prior to Visit   Medication Dose Route Frequency Provider Last Rate Last Admin    morphine sulfate (PF) injection 4 mg  4 mg IntraVENous Once Donavon Monson MD            ASCVD 10-YEAR RISK SCORE  The ASCVD Risk score (Gagan Aguilar, et al., 2013) failed to calculate for the following reasons:    Cannot find a previous HDL lab    Cannot find a previous total cholesterol lab   . Review of Systems  10-point ROS is negative other than HPI. Physical Exam  Based off 1997 Exam Criteria  Ht 6' 1\" (1.854 m)   Wt 190 lb (86.2 kg)   BMI 25.07 kg/m²      Constitutional:       General: He is not in acute distress. Appearance: Normal appearance. Cardiovascular:      Rate and Rhythm: Normal rate and regular rhythm. Pulses: Normal pulses. Pulmonary:      Effort: Pulmonary effort is normal. No respiratory distress. Neurological:      Mental Status: He is alert and oriented to person, place, and time. Mental status is at baseline. Musculoskeletal:  Gait:  antalgic    Skin: Clean and intact.   No open sores or wounds    Lymphatics: No palpable lymph nodes    Spine / Hip Exam:      RIGHT  LEFT    Lumbar Spine Exam  [x] All Neg    [x] All Neg     Straight leg raise  []  []Not tested   []  []Not tested    Clonus  []  []Not tested   []  []Not tested    Pain with motion  []  []Not tested   []  []Not tested    Radiculopathy  []  []Not tested   []  []Not tested    Paraspinal muscle tenderness  [] Paraspinal  []Midline   [] Paraspinal  []Midline   Sensation RIGHT  LEFT    L3  [x] Normal []Decreased    [x] Normal []Decreased   L4  [x] Normal  []Decreased   [x] Normal []Decreased   L5  [x] Normal []Decreased   [x] Normal []Decreased   S1  [x] Normal  []Decreased   [x] Normal []Decreased   Pelvis       Scoliosis  [x] Nml  [] Present     Leg-length discrepency  [x] Equal  [] Right longer   [] Left longer   Range of Motion Active Passive Active Passive   Hip Flexion 100  120    Abduction 30  50    External Rotation @ 90 flex 25  65    Internal Rotation @ 90 flex 10  20           Hip Impingement / Dysplasia  [x] All Neg  [] Not tested   [] All Neg  [] Not tested    Hip impingement test  []  []Not tested   []  []Not tested    C-sign  []  []Not tested   []  []Not tested    Anterior instability apprehension  []  []Not tested   []  []Not tested    Posterior instability apprehension  []  []Not tested   []  []Not tested    Uncontained Internal rotation  []  []Not tested  []  []Not tested          Abductors  [] All Neg  [] Not tested   [] All Neg  [] Not tested    Medius strength  [x]  []Not tested   []  []Not tested    Minimum strength  [x]  []Not tested   []  []Not expert knowledge and specialization in brace application while under the direct supervision of the treating physician. Verbal and written instructions for the use of and application of this item were provided. They were instructed to contact the office immediately should the brace result in increased pain, decreased sensation, increased swelling or worsening of the condition. Assessment and Plan  Diana Amador was seen today for knee pain and hip pain. Diagnoses and all orders for this visit:    Status post revision of total hip  -     XR HIP RIGHT (2-3 VIEWS); Future  -     DJO Reaction Brace    Right knee pain, unspecified chronicity  -     XR KNEE RIGHT (MIN 4 VIEWS); Future  -     DJO Reaction Brace    Localized osteoarthritis of right knee      Patient's hip is doing fairly well after revision surgery. I simply would monitor his symptoms at this time. I do not feel any additional surgery is necessary at this time. Patient does not wish to consider total knee arthroplasty. We will fit him for a lateral  brace. We will see him back on appearing basis. I have also offered him cortisone injections or viscosupplementation injection and he has refused    I discussed with Maye Walker that his history, symptoms, signs, and imaging are most consistent with previous CONNER replacement and right knee osteoarthritis    We reviewed the natural history of these conditions and treatment options ranging from conservative measures (rest, icing, activity modification, physical therapy, pain meds, cortisone injection) to surgical options. In terms of treatment, I recommended continuing with rest, icing, avoidance of painful activities, NSAIDs or pain meds as tolerated, and physical therapy. If these are not effective, cortisone injection can be considered. We discussed surgical options as well, should conservative measures fail.      Surgery is recommended to the patient at this time, however patient would like to proceed with conservative treatment. Electronically signed by Clementine Oconnell MD on 9/23/2022 at 12:15 PM  This dictation was generated by voice recognition computer software. Although all attempts are made to edit the dictation for accuracy, there may be errors in the transcription that are not intended.

## 2025-08-04 NOTE — PROGRESS NOTES
NOV: 08/29/2025    Requested Prescriptions     Pending Prescriptions Disp Refills    atorvastatin (Lipitor) 40 mg tablet [Pharmacy Med Name: Atorvastatin Calcium Oral Tablet 40 MG] 90 tablet 0     Sig: TAKE ONE TABLET BY MOUTH ONCE DAILY        Pt arrived to pacu asleep, oral airway intact. Pt connected to monitor. Adductor pillow in place. Pulses intact to right foot. Wound vac intact. dsg dry and intact.

## (undated) DEVICE — SOLUTION IV 1000ML 0.9% SOD CHL

## (undated) DEVICE — SOLUTION WND CLN 1000ML FOR VAC VERAFLO THER PRONTOSAN

## (undated) DEVICE — DRESSING THERABOND 3D ANTIMIC CNTCT SYS 15INCHX10INCH

## (undated) DEVICE — CABLE FIX L635MM DIA1.8MM HIP CO CHROM GTR CBL-READY: Type: IMPLANTABLE DEVICE | Site: HIP | Status: NON-FUNCTIONAL

## (undated) DEVICE — PILLOW POS W15XH6XL22IN RASPBERRY FOAM ABD W/ STRP DISP FOR

## (undated) DEVICE — SUTURE MCRYL SZ 3-0 L27IN ABSRB UD L19MM PS-2 3/8 CIR PRIM Y427H

## (undated) DEVICE — SPONGE LAP W18XL18IN WHT COT 4 PLY FLD STRUNG RADPQ DISP ST

## (undated) DEVICE — SUTURE MCRYL SZ 2-0 L18IN ABSRB VLT L36MM CT-1 1/2 CIR Y739D

## (undated) DEVICE — GLOVE ORANGE PI 8   MSG9080

## (undated) DEVICE — GLOVE ORANGE PI 7 1/2   MSG9075

## (undated) DEVICE — BIT DRL L30MM DIA3.2MM DISP FOR G7 2 MOBILITY CONSTRUCT

## (undated) DEVICE — SOLUTION IRRIG 3000ML 0.9% SOD CHL USP UROMATIC PLAS CONT

## (undated) DEVICE — PENCIL SMK EVAC ALL IN 1 DSGN ENH VISIBILITY IMPROVED AIR

## (undated) DEVICE — NEEDLE HYPO 22GA L1.5IN BLK POLYPR HUB S STL REG BVL STR

## (undated) DEVICE — Device

## (undated) DEVICE — SUTURE ETHBND EXCEL SZ 5 L30IN NONABSORBABLE GRN L40MM V-37 MB66G

## (undated) DEVICE — COVER LT HNDL PLAS RIG 2 PER PK

## (undated) DEVICE — SUTURE ETHBND EXCEL SZ 0 L18IN NONABSORBABLE GRN L36MM CT-1 CX21D

## (undated) DEVICE — GLOVE SURG SZ 8 L12IN FNGR THK79MIL GRN LTX FREE

## (undated) DEVICE — SUTURE ETHLN SZ 2-0 L18IN NONABSORBABLE BLK L26MM FS 3/8 664G

## (undated) DEVICE — 450 ML BOTTLE OF 0.05% CHLORHEXIDINE GLUCONATE IN 99.95% STERILE WATER FOR IRRIGATION, USP AND APPLICATOR.: Brand: IRRISEPT ANTIMICROBIAL WOUND LAVAGE

## (undated) DEVICE — SUTURE MCRYL SZ 0 L18IN ABSRB VLT L36MM CT-1 1/2 CIR Y740D

## (undated) DEVICE — NEEDLE SPNL 22GA L3.5IN BLK HUB S STL REG WALL FIT STYL W/

## (undated) DEVICE — SOLUTION IV IRRIG WATER 1000ML POUR BRL 2F7114

## (undated) DEVICE — KIT SEP W/ BLD DRAW TB SYR NDL TRNQT PD

## (undated) DEVICE — BOWL MED L 32OZ PLAS W/ MOLD GRAD EZ OPN PEEL PCH

## (undated) DEVICE — GLOVE SURG SZ 75 L12IN FNGR THK79MIL GRN LTX FREE

## (undated) DEVICE — BIPOLAR SEALER 23-112-1 AQM 6.0: Brand: AQUAMANTYS ®

## (undated) DEVICE — HANDPIECE SET WITH HIGH FLOW TIP AND SUCTION TUBE: Brand: INTERPULSE

## (undated) DEVICE — SUTURE STRATAFIX SPRL SZ 1 L14IN ABSRB VLT L48CM CTX 1/2 SXPD2B405

## (undated) DEVICE — PREVENA INCISION MANAGEMENT SYSTEM- PEEL & PLACE DRESSING: Brand: PREVENA™ PEEL & PLACE™

## (undated) DEVICE — GOWN,REINF,POLY,AURORA,XLNG/XXL,STRL: Brand: MEDLINE

## (undated) DEVICE — SYSTEM SKIN CLSR 22CM DERMBND PRINEO